# Patient Record
Sex: FEMALE | Race: WHITE | URBAN - METROPOLITAN AREA
[De-identification: names, ages, dates, MRNs, and addresses within clinical notes are randomized per-mention and may not be internally consistent; named-entity substitution may affect disease eponyms.]

---

## 2021-03-29 NOTE — PROGRESS NOTES
Chief Complaint   Patient presents with   [facility]    Wrist Pain     right no injury. started 6mnths ago. not got any better. feels a clicking          HPI:  Monse Avitia is a 51 y.o. (: 1975) here today to est care and right wrist pain. Started about 2 months ago and hurts at work with twisting motions. Has not tried anything OTC or brace. Better with break from work when she went camping. When she waked up t seems to hurt her as well. No numbness or tingling or muscle wasting or weakness. HTN  Rx: Maxide 37.5-25 mg daily  Compliant: yes  Does not check BP at home. No results found for: CREATININE     HLD  Rx: Diet-controlled, no meds  The ASCVD Risk score (Lisa Talavera., et al., 2013) failed to calculate for the following reasons:    Cannot find a previous HDL lab    Cannot find a previous total cholesterol lab     Overweight and has been working with previous provider for a years following her thyroid and hormone levels given her extremely strict diet and exercise regimen without any weight loss. No consideration given to bariatric surgery or surgical intervention. Review of Systems   Constitutional: Negative for activity change, appetite change, chills, fatigue, fever and unexpected weight change. HENT: Negative for congestion, postnasal drip, rhinorrhea, sinus pressure, sinus pain, sneezing and sore throat. Eyes: Negative for visual disturbance. Respiratory: Negative for cough, chest tightness, shortness of breath and wheezing. Cardiovascular: Negative for chest pain and palpitations. Gastrointestinal: Negative for abdominal pain, blood in stool, constipation, diarrhea, nausea and vomiting. Endocrine: Negative for cold intolerance, heat intolerance, polydipsia and polyuria. Genitourinary: Negative for dysuria, frequency, vaginal bleeding and vaginal discharge. Musculoskeletal: Positive for arthralgias.  Negative for back pain, joint swelling, myalgias and neck pain. Skin: Negative for rash and wound. Allergic/Immunologic: Negative for environmental allergies. Neurological: Negative for dizziness, tremors, syncope, weakness, light-headedness, numbness and headaches. Hematological: Negative for adenopathy. Psychiatric/Behavioral: Negative for behavioral problems, decreased concentration, sleep disturbance and suicidal ideas. The patient is not nervous/anxious. No past medical history on file. Family History   Problem Relation Age of Onset    Hypothyroidism Mother     Asthma Mother     Diabetes Mother     COPD Mother     No Known Problems Father        Social History     Tobacco Use    Smoking status: Former Smoker     Years: 2.00     Quit date:      Years since quittin.2    Smokeless tobacco: Never Used   Substance Use Topics    Alcohol use: Not Currently    Drug use: Never       New Prescriptions    MISC. DEVICES (WRIST BRACE) MISC    Wear daily and nightly on right wrist       Meds Prior to visit:  Current Outpatient Medications on File Prior to Visit   Medication Sig Dispense Refill    triamterene-hydroCHLOROthiazide (MAXZIDE-25) 37.5-25 MG per tablet Take 1 tablet by mouth daily      BLISOVI FE  1-20 MG-MCG per tablet       albuterol sulfate HFA (VENTOLIN HFA) 108 (90 Base) MCG/ACT inhaler INHALE 2 PUFFS BY MOUTH EVERY 6 HOURS AS NEEDED FOR WHEEZING OR SHORTNESS OF BREATH      fluticasone-salmeterol (ADVAIR HFA) 230-21 MCG/ACT inhaler Inhale 2 puffs into the lungs every 12 hours      Multiple Vitamin (MULTIVITAMIN) tablet Take 1 tablet by mouth daily       No current facility-administered medications on file prior to visit.       No Known Allergies    OBJECTIVE:  /84   Pulse 74   Resp 16   Ht 5' 2\" (1.575 m)   Wt 229 lb 6.4 oz (104.1 kg)   LMP 2021   SpO2 99%   Breastfeeding No   BMI 41.96 kg/m²   BP Readings from Last 2 Encounters:   21 126/84     Wt Readings from Last 3 Encounters:   21 229 lb 6.4 oz (104.1 kg)       Physical Exam  Vitals signs reviewed. Constitutional:       General: She is not in acute distress. Appearance: She is well-developed. HENT:      Head: Normocephalic and atraumatic. Right Ear: Tympanic membrane, ear canal and external ear normal. There is no impacted cerumen. Left Ear: Tympanic membrane, ear canal and external ear normal. There is no impacted cerumen. Mouth/Throat:      Mouth: Mucous membranes are moist.      Pharynx: Oropharynx is clear. No oropharyngeal exudate or posterior oropharyngeal erythema. Eyes:      General: No scleral icterus. Right eye: No discharge. Left eye: No discharge. Conjunctiva/sclera: Conjunctivae normal.      Pupils: Pupils are equal, round, and reactive to light. Neck:      Musculoskeletal: Normal range of motion and neck supple. Cardiovascular:      Rate and Rhythm: Normal rate and regular rhythm. Heart sounds: Normal heart sounds. No murmur. Comments: Radial and pedal pulses intact  Pulmonary:      Effort: Pulmonary effort is normal. No respiratory distress. Breath sounds: Normal breath sounds. No wheezing or rales. Chest:      Chest wall: No tenderness. Abdominal:      General: Bowel sounds are normal.      Palpations: Abdomen is soft. Tenderness: There is no abdominal tenderness. There is no guarding. Comments: Normal liver and spleen. No organomegaly   Musculoskeletal: Normal range of motion. General: No tenderness. Comments: Intact in all extremities. No crepitus with passive or active range of motion of right wrist or left wrist.  No point tenderness on right wrist.  When fully flexed at rest, tension experienced over posterior wrist.   Lymphadenopathy:      Cervical: No cervical adenopathy. Skin:     General: Skin is warm. Findings: No erythema or rash. Neurological:      General: No focal deficit present.       Mental Status: She is alert and oriented to person, place, and time. Mental status is at baseline. Motor: No weakness or abnormal muscle tone. Coordination: Coordination normal.      Gait: Gait normal.      Deep Tendon Reflexes: Reflexes normal.   Psychiatric:         Mood and Affect: Mood normal.         Behavior: Behavior normal.         Thought Content: Thought content normal.         Judgment: Judgment normal.         ASSESSMENT/PLAN:  1. Encounter to establish care  VS reviewed and WNL    BMI reviewed   All questions answered. F/u discussed. Healthy lifestyle modifications discussed. 2. Right wrist pain  No concern for carpal tunnel. May be tendonitis or arthritis. Recommend NSAIDs and splint during work and while sleeping. Follow up in 4-6 weeks if no improvement. May try oral steroids or referral to PT.   - Misc. Devices (WRIST BRACE) MISC; Wear daily and nightly on right wrist  Dispense: 1 each; Refill: 0    3. Elevated LDL cholesterol level  Update and treat accordingly.   - Lipid Panel; Future    4. BMI 40.0-44.9, adult ([facility] [address]  As above. Will continue dietary mods and activity.   - TSH with Reflex; Future    5. Essential hypertension  Continue regimen and update labs. Refill meds. Follow up in 3-6 months for monitoring.   - Lipid Panel; Future  - triamterene-hydroCHLOROthiazide (MAXZIDE-25) 37.5-25 MG per tablet; Take 1 tablet by mouth daily  - Comprehensive Metabolic Panel; Future        Discussed use, benefit, and side effects of prescribed medications. Barriers to medication compliance addressed. All patient questions answered. Pt voiced understanding. RTC Return in about 1 year (around 3/30/2022), or if symptoms worsen or fail to improve. No future appointments.     Fadia Meredith MD  3/30/2021  4:38 PM

## 2021-03-30 ENCOUNTER — OFFICE VISIT (OUTPATIENT)
Dept: PRIMARY CARE CLINIC | Age: 46
End: 2021-03-30
Payer: COMMERCIAL

## 2021-03-30 VITALS
RESPIRATION RATE: 16 BRPM | BODY MASS INDEX: 42.21 KG/M2 | OXYGEN SATURATION: 99 % | HEART RATE: 74 BPM | DIASTOLIC BLOOD PRESSURE: 84 MMHG | HEIGHT: 62 IN | SYSTOLIC BLOOD PRESSURE: 126 MMHG | WEIGHT: 229.4 LBS

## 2021-03-30 DIAGNOSIS — I10 ESSENTIAL HYPERTENSION: ICD-10-CM

## 2021-03-30 DIAGNOSIS — Z76.89 ENCOUNTER TO ESTABLISH CARE: Primary | ICD-10-CM

## 2021-03-30 DIAGNOSIS — M25.531 RIGHT WRIST PAIN: ICD-10-CM

## 2021-03-30 DIAGNOSIS — E78.00 ELEVATED LDL CHOLESTEROL LEVEL: ICD-10-CM

## 2021-03-30 PROCEDURE — 99204 OFFICE O/P NEW MOD 45 MIN: CPT | Performed by: FAMILY MEDICINE

## 2021-03-30 RX ORDER — MULTIVITAMIN
1 TABLET ORAL DAILY
COMMUNITY

## 2021-03-30 RX ORDER — TRIAMTERENE AND HYDROCHLOROTHIAZIDE 37.5; 25 MG/1; MG/1
1 TABLET ORAL DAILY
COMMUNITY
Start: 2020-06-25

## 2021-03-30 RX ORDER — ALBUTEROL SULFATE 90 UG/1
AEROSOL, METERED RESPIRATORY (INHALATION)
COMMUNITY
Start: 2020-11-06 | End: 2021-11-12 | Stop reason: SDUPTHER

## 2021-03-30 RX ORDER — NORETHINDRONE ACETATE AND ETHINYL ESTRADIOL 1MG-20(21)
KIT ORAL
COMMUNITY
Start: 2021-02-17

## 2021-03-30 SDOH — HEALTH STABILITY: MENTAL HEALTH: HOW OFTEN DO YOU HAVE A DRINK CONTAINING ALCOHOL?: NOT ASKED

## 2021-03-30 SDOH — ECONOMIC STABILITY: FOOD INSECURITY: WITHIN THE PAST 12 MONTHS, YOU WORRIED THAT YOUR FOOD WOULD RUN OUT BEFORE YOU GOT MONEY TO BUY MORE.: NEVER TRUE

## 2021-03-30 SDOH — ECONOMIC STABILITY: TRANSPORTATION INSECURITY
IN THE PAST 12 MONTHS, HAS LACK OF TRANSPORTATION KEPT YOU FROM MEETINGS, WORK, OR FROM GETTING THINGS NEEDED FOR DAILY LIVING?: NO

## 2021-03-30 SDOH — ECONOMIC STABILITY: TRANSPORTATION INSECURITY
IN THE PAST 12 MONTHS, HAS THE LACK OF TRANSPORTATION KEPT YOU FROM MEDICAL APPOINTMENTS OR FROM GETTING MEDICATIONS?: NO

## 2021-03-30 SDOH — HEALTH STABILITY: MENTAL HEALTH: HOW MANY STANDARD DRINKS CONTAINING ALCOHOL DO YOU HAVE ON A TYPICAL DAY?: NOT ASKED

## 2021-03-30 SDOH — ECONOMIC STABILITY: INCOME INSECURITY: HOW HARD IS IT FOR YOU TO PAY FOR THE VERY BASICS LIKE FOOD, HOUSING, MEDICAL CARE, AND HEATING?: NOT HARD AT ALL

## 2021-03-30 ASSESSMENT — ENCOUNTER SYMPTOMS
SORE THROAT: 0
CHEST TIGHTNESS: 0
SINUS PRESSURE: 0
BLOOD IN STOOL: 0
BACK PAIN: 0
SINUS PAIN: 0
ABDOMINAL PAIN: 0
DIARRHEA: 0
CONSTIPATION: 0
RHINORRHEA: 0
NAUSEA: 0
VOMITING: 0
COUGH: 0
SHORTNESS OF BREATH: 0
WHEEZING: 0

## 2021-03-30 ASSESSMENT — PATIENT HEALTH QUESTIONNAIRE - PHQ9
SUM OF ALL RESPONSES TO PHQ9 QUESTIONS 1 & 2: 0
SUM OF ALL RESPONSES TO PHQ QUESTIONS 1-9: 0
SUM OF ALL RESPONSES TO PHQ QUESTIONS 1-9: 0
DEPRESSION UNABLE TO ASSESS: FUNCTIONAL CAPACITY MOTIVATION LIMITS ACCURACY
2. FEELING DOWN, DEPRESSED OR HOPELESS: 0

## 2021-03-31 DIAGNOSIS — I10 ESSENTIAL HYPERTENSION: ICD-10-CM

## 2021-03-31 DIAGNOSIS — E78.00 ELEVATED LDL CHOLESTEROL LEVEL: ICD-10-CM

## 2021-03-31 LAB
A/G RATIO: 1.4 (ref 1.1–2.2)
ALBUMIN SERPL-MCNC: 4.1 G/DL (ref 3.4–5)
ALP BLD-CCNC: 84 U/L (ref 40–129)
ALT SERPL-CCNC: 15 U/L (ref 10–40)
ANION GAP SERPL CALCULATED.3IONS-SCNC: 12 MMOL/L (ref 3–16)
AST SERPL-CCNC: 18 U/L (ref 15–37)
BILIRUB SERPL-MCNC: 0.3 MG/DL (ref 0–1)
BUN BLDV-MCNC: 10 MG/DL (ref 7–20)
CALCIUM SERPL-MCNC: 8.8 MG/DL (ref 8.3–10.6)
CHLORIDE BLD-SCNC: 104 MMOL/L (ref 99–110)
CHOLESTEROL, TOTAL: 240 MG/DL (ref 0–199)
CO2: 25 MMOL/L (ref 21–32)
CREAT SERPL-MCNC: 0.6 MG/DL (ref 0.6–1.1)
GFR AFRICAN AMERICAN: >60
GFR NON-AFRICAN AMERICAN: >60
GLOBULIN: 3 G/DL
GLUCOSE BLD-MCNC: 87 MG/DL (ref 70–99)
HDLC SERPL-MCNC: 52 MG/DL (ref 40–60)
LDL CHOLESTEROL CALCULATED: 157 MG/DL
POTASSIUM SERPL-SCNC: 4.2 MMOL/L (ref 3.5–5.1)
SODIUM BLD-SCNC: 141 MMOL/L (ref 136–145)
T4 FREE: 1.2 NG/DL (ref 0.9–1.8)
TOTAL PROTEIN: 7.1 G/DL (ref 6.4–8.2)
TRIGL SERPL-MCNC: 154 MG/DL (ref 0–150)
TSH REFLEX: 4.42 UIU/ML (ref 0.27–4.2)
VLDLC SERPL CALC-MCNC: 31 MG/DL

## 2021-10-19 ENCOUNTER — TELEPHONE (OUTPATIENT)
Dept: PRIMARY CARE CLINIC | Age: 46
End: 2021-10-19

## 2021-10-19 DIAGNOSIS — R79.89 ELEVATED TSH: ICD-10-CM

## 2021-10-19 DIAGNOSIS — I10 ESSENTIAL HYPERTENSION: Primary | ICD-10-CM

## 2021-10-19 DIAGNOSIS — E78.2 MIXED HYPERLIPIDEMIA: ICD-10-CM

## 2021-10-20 DIAGNOSIS — E78.2 MIXED HYPERLIPIDEMIA: ICD-10-CM

## 2021-10-20 DIAGNOSIS — R79.89 ELEVATED TSH: ICD-10-CM

## 2021-10-20 DIAGNOSIS — I10 ESSENTIAL HYPERTENSION: ICD-10-CM

## 2021-10-20 LAB
A/G RATIO: 1.6 (ref 1.1–2.2)
ALBUMIN SERPL-MCNC: 4.2 G/DL (ref 3.4–5)
ALP BLD-CCNC: 90 U/L (ref 40–129)
ALT SERPL-CCNC: 21 U/L (ref 10–40)
ANION GAP SERPL CALCULATED.3IONS-SCNC: 14 MMOL/L (ref 3–16)
AST SERPL-CCNC: 21 U/L (ref 15–37)
BASOPHILS ABSOLUTE: 0.1 K/UL (ref 0–0.2)
BASOPHILS RELATIVE PERCENT: 0.7 %
BILIRUB SERPL-MCNC: 0.4 MG/DL (ref 0–1)
BUN BLDV-MCNC: 15 MG/DL (ref 7–20)
CALCIUM SERPL-MCNC: 8.9 MG/DL (ref 8.3–10.6)
CHLORIDE BLD-SCNC: 102 MMOL/L (ref 99–110)
CHOLESTEROL, TOTAL: 241 MG/DL (ref 0–199)
CO2: 26 MMOL/L (ref 21–32)
CREAT SERPL-MCNC: 0.6 MG/DL (ref 0.6–1.1)
EOSINOPHILS ABSOLUTE: 0.2 K/UL (ref 0–0.6)
EOSINOPHILS RELATIVE PERCENT: 1.7 %
GFR AFRICAN AMERICAN: >60
GFR NON-AFRICAN AMERICAN: >60
GLOBULIN: 2.7 G/DL
GLUCOSE BLD-MCNC: 86 MG/DL (ref 70–99)
HCT VFR BLD CALC: 37.8 % (ref 36–48)
HDLC SERPL-MCNC: 50 MG/DL (ref 40–60)
HEMOGLOBIN: 12.5 G/DL (ref 12–16)
LDL CHOLESTEROL CALCULATED: 161 MG/DL
LYMPHOCYTES ABSOLUTE: 4.3 K/UL (ref 1–5.1)
LYMPHOCYTES RELATIVE PERCENT: 37.7 %
MCH RBC QN AUTO: 27.8 PG (ref 26–34)
MCHC RBC AUTO-ENTMCNC: 33 G/DL (ref 31–36)
MCV RBC AUTO: 84.1 FL (ref 80–100)
MONOCYTES ABSOLUTE: 0.8 K/UL (ref 0–1.3)
MONOCYTES RELATIVE PERCENT: 7 %
NEUTROPHILS ABSOLUTE: 6 K/UL (ref 1.7–7.7)
NEUTROPHILS RELATIVE PERCENT: 52.9 %
PDW BLD-RTO: 14 % (ref 12.4–15.4)
PLATELET # BLD: 267 K/UL (ref 135–450)
PMV BLD AUTO: 9.5 FL (ref 5–10.5)
POTASSIUM SERPL-SCNC: 3.7 MMOL/L (ref 3.5–5.1)
RBC # BLD: 4.49 M/UL (ref 4–5.2)
SODIUM BLD-SCNC: 142 MMOL/L (ref 136–145)
TOTAL PROTEIN: 6.9 G/DL (ref 6.4–8.2)
TRIGL SERPL-MCNC: 152 MG/DL (ref 0–150)
TSH REFLEX: 2.53 UIU/ML (ref 0.27–4.2)
VLDLC SERPL CALC-MCNC: 30 MG/DL
WBC # BLD: 11.4 K/UL (ref 4–11)

## 2021-10-25 NOTE — PROGRESS NOTES
Chief Complaint   Patient presents with    Follow-up         ASSESSMENT/PLAN:  1. Essential hypertension  Controlled in triage  We will continue Maxide 37.5 - 25 mg daily  Labs up-to-date, reviewed with patient  Continue regimen and follow-up in 3 to 6 months    2. Mixed hyperlipidemia  Labs up-to-date, reviewed with patient  All questions answered  Stratified ASCVD risk in the next 10 years  Discussed not taking aspirin but starting statin  Patient would like to start in follow-up to repeat lipid panel in 6 months  - atorvastatin (LIPITOR) 20 MG tablet; Take 1 tablet by mouth daily  Dispense: 90 tablet; Refill: 3    3. Colon cancer screening  Ordered and will fax  - [employer] (For External Results Only); Future    4. Morbid Obesity  Discussed typical diet  Unsure of caloric intake on a daily basis. She eats the same foods every day  Walks 3 miles. Follow up in 3 months  Has lost 4 lbs since LOV. HPI:  Reina Ga is a 51 y.o. (: 1975) here today for     HTN  Rx: Maxide 37.5-25 mg daily  Compliant: yes  Does not check BP at home. Lab Results   Component Value Date    CREATININE 0.6 10/20/2021       HLD  The 10-year ASCVD risk score (Zuleyma Correa, et al., 2013) is: 2.2%    Values used to calculate the score:      Age: 51 years      Sex: Female      Is Non- : No      Diabetic: No      Tobacco smoker: No      Systolic Blood Pressure: 598 mmHg      Is BP treated: Yes      HDL Cholesterol: 50 mg/dL      Total Cholesterol: 241 mg/dL      Wt Readings from Last 3 Encounters:   10/26/21 225 lb (102.1 kg)   21 229 lb 6.4 oz (104.1 kg)   Not interested in bariatric surgery  Dietary intake:  Eats the same thing everyday  [place] yogurt and grapes  Wrap with turkey or chicken  Salad with chicken  Cubed cheese, 5 cubes  Walks 3 miles a day      Review of Systems   Constitutional: Negative for appetite change, chills, fatigue and fever. HENT: Negative for congestion.     Eyes: Negative for pain and visual disturbance. Respiratory: Negative for cough and shortness of breath. Cardiovascular: Negative for chest pain and palpitations. Gastrointestinal: Negative for abdominal pain, constipation and diarrhea. Genitourinary: Negative for difficulty urinating. Musculoskeletal: Negative for arthralgias. Skin: Negative for rash and wound. Neurological: Negative for dizziness, weakness, light-headedness and headaches. Hematological: Does not bruise/bleed easily. Psychiatric/Behavioral: Negative for behavioral problems. History reviewed. No pertinent past medical history. Family History   Problem Relation Age of Onset   Courtenay Spurling Hypothyroidism Mother     Asthma Mother     Diabetes Mother     COPD Mother     No Known Problems Father        Social History     Tobacco Use    Smoking status: Former Smoker     Years: 2.00     Quit date:      Years since quittin.8    Smokeless tobacco: Never Used   Substance Use Topics    Alcohol use: Not Currently    Drug use: Never       New Prescriptions    ATORVASTATIN (LIPITOR) 20 MG TABLET    Take 1 tablet by mouth daily       Meds Prior to visit:  Current Outpatient Medications on File Prior to Visit   Medication Sig Dispense Refill    triamterene-hydroCHLOROthiazide (MAXZIDE-25) 37.5-25 MG per tablet Take 1 tablet by mouth daily      BLISOVI FE  1-20 MG-MCG per tablet       albuterol sulfate HFA (VENTOLIN HFA) 108 (90 Base) MCG/ACT inhaler INHALE 2 PUFFS BY MOUTH EVERY 6 HOURS AS NEEDED FOR WHEEZING OR SHORTNESS OF BREATH      fluticasone-salmeterol (ADVAIR HFA) 230-21 MCG/ACT inhaler Inhale 2 puffs into the lungs every 12 hours      Multiple Vitamin (MULTIVITAMIN) tablet Take 1 tablet by mouth daily      Misc. Devices (WRIST BRACE) MISC Wear daily and nightly on right wrist 1 each 0    montelukast (SINGULAIR) 10 MG tablet        No current facility-administered medications on file prior to visit.      No Known Allergies    OBJECTIVE:  /82 (Site: Left Upper Arm)   Pulse 92   Wt 225 lb (102.1 kg)   SpO2 99%   BMI 41.15 kg/m²   BP Readings from Last 2 Encounters:   10/26/21 135/82   03/30/21 126/84     Wt Readings from Last 3 Encounters:   10/26/21 225 lb (102.1 kg)   03/30/21 229 lb 6.4 oz (104.1 kg)       Physical Exam  Vitals reviewed. Constitutional:       General: She is not in acute distress. Appearance: Normal appearance. She is well-developed. She is not ill-appearing. HENT:      Head: Normocephalic and atraumatic. Right Ear: Tympanic membrane, ear canal and external ear normal. There is no impacted cerumen. Left Ear: Tympanic membrane, ear canal and external ear normal. There is no impacted cerumen. Nose: Nose normal. No congestion. Mouth/Throat:      Mouth: Mucous membranes are moist.      Pharynx: Oropharynx is clear. No oropharyngeal exudate or posterior oropharyngeal erythema. Eyes:      General: No scleral icterus. Right eye: No discharge. Left eye: No discharge. Extraocular Movements: Extraocular movements intact. Conjunctiva/sclera: Conjunctivae normal.      Pupils: Pupils are equal, round, and reactive to light. Cardiovascular:      Rate and Rhythm: Normal rate and regular rhythm. Pulses: Normal pulses. Heart sounds: Normal heart sounds. No murmur heard. Comments: Radial and pedal pulses intact  Pulmonary:      Effort: Pulmonary effort is normal. No respiratory distress. Breath sounds: Normal breath sounds. No stridor. No wheezing, rhonchi or rales. Chest:      Chest wall: No tenderness. Abdominal:      General: Bowel sounds are normal.      Palpations: Abdomen is soft. Tenderness: There is no abdominal tenderness. There is no guarding. Comments: Normal liver and spleen. No organomegaly   Musculoskeletal:         General: No tenderness. Normal range of motion.       Cervical back: Normal range of motion and neck supple. Right lower leg: No edema. Left lower leg: No edema. Comments: Intact in all extremities   Lymphadenopathy:      Cervical: No cervical adenopathy. Skin:     General: Skin is warm. Capillary Refill: Capillary refill takes less than 2 seconds. Findings: No erythema or rash. Neurological:      General: No focal deficit present. Mental Status: She is alert and oriented to person, place, and time. Mental status is at baseline. Motor: No weakness or abnormal muscle tone. Coordination: Coordination normal.      Gait: Gait normal.      Deep Tendon Reflexes: Reflexes normal.   Psychiatric:         Mood and Affect: Mood normal.         Behavior: Behavior normal.         Thought Content:  Thought content normal.         Judgment: Judgment normal.         Lab Results   Component Value Date    WBC 11.4 (H) 10/20/2021    HGB 12.5 10/20/2021    HCT 37.8 10/20/2021    MCV 84.1 10/20/2021     10/20/2021     Lab Results   Component Value Date     10/20/2021    K 3.7 10/20/2021     10/20/2021    CO2 26 10/20/2021    BUN 15 10/20/2021    CREATININE 0.6 10/20/2021    GLUCOSE 86 10/20/2021    CALCIUM 8.9 10/20/2021    PROT 6.9 10/20/2021    LABALBU 4.2 10/20/2021    BILITOT 0.4 10/20/2021    ALKPHOS 90 10/20/2021    AST 21 10/20/2021    ALT 21 10/20/2021    LABGLOM >60 10/20/2021    GFRAA >60 10/20/2021    AGRATIO 1.6 10/20/2021    GLOB 2.7 10/20/2021     Lab Results   Component Value Date    CHOL 241 (H) 10/20/2021    CHOL 240 (H) 03/31/2021     Lab Results   Component Value Date    TRIG 152 (H) 10/20/2021    TRIG 154 (H) 03/31/2021     Lab Results   Component Value Date    HDL 50 10/20/2021    HDL 52 03/31/2021     Lab Results   Component Value Date    LDLCALC 161 (H) 10/20/2021    LDLCALC 157 (H) 03/31/2021     Lab Results   Component Value Date    LABVLDL 30 10/20/2021    LABVLDL 31 03/31/2021     No results found for: LABA1C      Discussed use, benefit, and side effects of prescribed medications. Barriers to medication compliance addressed. All patient questions answered. Pt voiced understanding. RTC Return in about 3 months (around 1/26/2022).     Future Appointments   Date Time Provider Tameka Dumont   1/25/2022  1:00 PM MD Shell Angulo MD  10/26/2021  4:16 PM

## 2021-10-26 ENCOUNTER — OFFICE VISIT (OUTPATIENT)
Dept: PRIMARY CARE CLINIC | Age: 46
End: 2021-10-26
Payer: COMMERCIAL

## 2021-10-26 VITALS
SYSTOLIC BLOOD PRESSURE: 135 MMHG | HEART RATE: 92 BPM | WEIGHT: 225 LBS | OXYGEN SATURATION: 99 % | BODY MASS INDEX: 41.15 KG/M2 | DIASTOLIC BLOOD PRESSURE: 82 MMHG

## 2021-10-26 DIAGNOSIS — E78.2 MIXED HYPERLIPIDEMIA: ICD-10-CM

## 2021-10-26 DIAGNOSIS — Z12.11 COLON CANCER SCREENING: ICD-10-CM

## 2021-10-26 DIAGNOSIS — I10 ESSENTIAL HYPERTENSION: Primary | ICD-10-CM

## 2021-10-26 PROBLEM — R79.89 ELEVATED TSH: Status: ACTIVE | Noted: 2019-02-22

## 2021-10-26 PROBLEM — K21.9 GERD (GASTROESOPHAGEAL REFLUX DISEASE): Status: ACTIVE | Noted: 2019-09-24

## 2021-10-26 PROCEDURE — 99214 OFFICE O/P EST MOD 30 MIN: CPT | Performed by: FAMILY MEDICINE

## 2021-10-26 RX ORDER — ATORVASTATIN CALCIUM 20 MG/1
20 TABLET, FILM COATED ORAL DAILY
Qty: 90 TABLET | Refills: 3 | Status: SHIPPED | OUTPATIENT
Start: 2021-10-26 | End: 2022-05-11 | Stop reason: SDUPTHER

## 2021-10-26 RX ORDER — MONTELUKAST SODIUM 10 MG/1
TABLET ORAL
COMMUNITY
Start: 2021-08-03

## 2021-10-26 ASSESSMENT — ENCOUNTER SYMPTOMS
ABDOMINAL PAIN: 0
SHORTNESS OF BREATH: 0
CONSTIPATION: 0
DIARRHEA: 0
EYE PAIN: 0
COUGH: 0

## 2021-11-12 RX ORDER — ALBUTEROL SULFATE 90 UG/1
AEROSOL, METERED RESPIRATORY (INHALATION)
Qty: 18 G | Refills: 3 | Status: SHIPPED | OUTPATIENT
Start: 2021-11-12 | End: 2022-04-22 | Stop reason: SDUPTHER

## 2021-12-27 ENCOUNTER — OFFICE VISIT (OUTPATIENT)
Dept: PRIMARY CARE CLINIC | Age: 46
End: 2021-12-27
Payer: COMMERCIAL

## 2021-12-27 VITALS
DIASTOLIC BLOOD PRESSURE: 72 MMHG | WEIGHT: 224.6 LBS | HEIGHT: 62 IN | SYSTOLIC BLOOD PRESSURE: 136 MMHG | BODY MASS INDEX: 41.33 KG/M2 | TEMPERATURE: 97.8 F

## 2021-12-27 DIAGNOSIS — J01.90 ACUTE BACTERIAL RHINOSINUSITIS: Primary | ICD-10-CM

## 2021-12-27 DIAGNOSIS — J45.41 MODERATE PERSISTENT ASTHMA WITH EXACERBATION: ICD-10-CM

## 2021-12-27 DIAGNOSIS — R05.9 COUGH: ICD-10-CM

## 2021-12-27 DIAGNOSIS — B96.89 ACUTE BACTERIAL RHINOSINUSITIS: Primary | ICD-10-CM

## 2021-12-27 PROCEDURE — 99214 OFFICE O/P EST MOD 30 MIN: CPT | Performed by: STUDENT IN AN ORGANIZED HEALTH CARE EDUCATION/TRAINING PROGRAM

## 2021-12-27 RX ORDER — AZITHROMYCIN 250 MG/1
250 TABLET, FILM COATED ORAL SEE ADMIN INSTRUCTIONS
Qty: 6 TABLET | Refills: 0 | Status: SHIPPED | OUTPATIENT
Start: 2021-12-27 | End: 2022-01-01

## 2021-12-27 RX ORDER — BENZONATATE 100 MG/1
100 CAPSULE ORAL 2 TIMES DAILY PRN
Qty: 20 CAPSULE | Refills: 0 | Status: SHIPPED | OUTPATIENT
Start: 2021-12-27 | End: 2022-01-03

## 2021-12-27 RX ORDER — AMOXICILLIN AND CLAVULANATE POTASSIUM 875; 125 MG/1; MG/1
1 TABLET, FILM COATED ORAL 2 TIMES DAILY
Qty: 20 TABLET | Refills: 0 | Status: SHIPPED | OUTPATIENT
Start: 2021-12-27 | End: 2022-01-06

## 2021-12-27 RX ORDER — PREDNISONE 20 MG/1
40 TABLET ORAL DAILY
Qty: 10 TABLET | Refills: 0 | Status: SHIPPED | OUTPATIENT
Start: 2021-12-27 | End: 2022-01-01

## 2021-12-27 NOTE — LETTER
[facility]  [address] [place] [id number]  Phone: [phone]  Fax: [phone],         December 27, 2021     Patient: Arnaldo Cancer   YOB: 1975   Date of Visit: 12/27/2021       To Whom It May Concern: It is my medical opinion that Grisel Imus should remain out of work for the next 2 days. If you have any questions or concerns, please don't hesitate to call.     Sincerely,        Salo Medellin DO

## 2021-12-27 NOTE — PROGRESS NOTES
[facility]  2021    Camila Paz (:  1975) is a 51 y.o. female, here for evaluation of the following medical concerns:    Chief Complaint   Patient presents with    Cough     3 weeks    Oral Swelling     Both Ears are in pain, eyes are pink in the moring and closed shut for 3 days         ASSESSMENT/ PLAN  1. Acute bacterial rhinosinusitis  Uncontrolled. This is likely a upper respiratory viral infection that has progressed to a secondary bacterial infection. Start antibiotic, follow-up if progressive or worsening  - amoxicillin-clavulanate (AUGMENTIN) 875-125 MG per tablet; Take 1 tablet by mouth 2 times daily for 10 days  Dispense: 20 tablet; Refill: 0    2. Moderate persistent asthma with exacerbation  Uncontrolled, will start prednisone and add Zithromax to cover possible atypical pneumonia. Chest x-ray per below. May ultimately need a daily maintenance inhaler.  - azithromycin (ZITHROMAX) 250 MG tablet; Take 1 tablet by mouth See Admin Instructions for 5 days 500mg on day 1 followed by 250mg on days 2 - 5  Dispense: 6 tablet; Refill: 0  - predniSONE (DELTASONE) 20 MG tablet; Take 2 tablets by mouth daily for 5 days  Dispense: 10 tablet; Refill: 0    3. Cough  Uncontrolled, assess for bacterial pneumonia. 14 Covid tests and an influenza test have been negative. Already started on Augmentin and Zithromax regimen. Tessalon Perles for symptomatic care. - XR CHEST (2 VW); Future   - benzonatate (TESSALON) 100 MG capsule; Take 1 capsule by mouth 2 times daily as needed for Cough  Dispense: 20 capsule; Refill: 0    Return in about 3 days if symptoms worsen or fail to improve. HPI  Patient with a history of asthma presents today with 3 weeks of productive cough, wheezing, sinus and bilateral ear pressure, myalgias. She woke up this morning with bilateral conjunctivitis and eye redness. She has been Covid tested daily for the past 2 weeks, and all tests have been negative. She has received her Covid vaccine and booster. She has been using her rescue inhaler without improvement in symptoms. ROS  Review of Systems   Constitutional: Positive for fatigue. Negative for activity change, appetite change and fever. HENT: Positive for congestion, ear pain, rhinorrhea and sinus pressure. Negative for sore throat. Eyes: Positive for redness. Negative for pain. Respiratory: Positive for cough and wheezing. Negative for shortness of breath. Cardiovascular: Negative for chest pain. Gastrointestinal: Negative for diarrhea, nausea and vomiting. Genitourinary: Negative for decreased urine volume. Musculoskeletal: Positive for myalgias. Neurological: Negative for headaches. HISTORIES  Current Outpatient Medications on File Prior to Visit   Medication Sig Dispense Refill    albuterol sulfate HFA (VENTOLIN HFA) 108 (90 Base) MCG/ACT inhaler INHALE 2 PUFFS BY MOUTH EVERY 6 HOURS AS NEEDED FOR WHEEZING OR SHORTNESS OF BREATH 18 g 3    atorvastatin (LIPITOR) 20 MG tablet Take 1 tablet by mouth daily 90 tablet 3    montelukast (SINGULAIR) 10 MG tablet       triamterene-hydroCHLOROthiazide (MAXZIDE-25) 37.5-25 MG per tablet Take 1 tablet by mouth daily      BLISOVI FE 1/20 1-20 MG-MCG per tablet       fluticasone-salmeterol (ADVAIR HFA) 230-21 MCG/ACT inhaler Inhale 2 puffs into the lungs every 12 hours      Multiple Vitamin (MULTIVITAMIN) tablet Take 1 tablet by mouth daily      Misc. Devices (WRIST BRACE) MISC Wear daily and nightly on right wrist 1 each 0     No current facility-administered medications on file prior to visit. No Known Allergies  No past medical history on file.   Patient Active Problem List   Diagnosis    Varicose veins of leg with swelling    Mixed hyperlipidemia    Mild persistent asthma without complication    Hypertension    GERD (gastroesophageal reflux disease)    Family history of diabetes mellitus    Elevated TSH    Baker's cyst of knee     No past surgical history on file. Social History     Socioeconomic History    Marital status:      Spouse name: Not on file    Number of children: Not on file    Years of education: Not on file    Highest education level: Not on file   Occupational History    Not on file   Tobacco Use    Smoking status: Former Smoker     Years: 2.00     Quit date:      Years since quittin.0    Smokeless tobacco: Never Used   Substance and Sexual Activity    Alcohol use: Not Currently    Drug use: Never    Sexual activity: Not Currently     Birth control/protection: Pill   Other Topics Concern    Not on file   Social History Narrative    Not on file     Social Determinants of Health     Financial Resource Strain: Low Risk     Difficulty of Paying Living Expenses: Not hard at all   Food Insecurity: No Food Insecurity    Worried About [address] in the Last Year: Never true    [address] [employer] in the Last Year: Never true   Transportation Needs: No Transportation Needs    Lack of Transportation (Medical): No    Lack of Transportation (Non-Medical):  No   Physical Activity:     Days of Exercise per Week: Not on file    Minutes of Exercise per Session: Not on file   Stress:     Feeling of Stress : Not on file   Social Connections:     Frequency of Communication with Friends and Family: Not on file    Frequency of Social Gatherings with Friends and Family: Not on file    Attends Orthodoxy Services: Not on file    Active Member of Clubs or Organizations: Not on file    Attends Club or Organization Meetings: Not on file    Marital Status: Not on file   Intimate Partner Violence:     Fear of Current or Ex-Partner: Not on file    Emotionally Abused: Not on file    Physically Abused: Not on file    Sexually Abused: Not on file   Housing Stability:     Unable to Pay for Housing in the Last Year: Not on file    Number of Places Lived in the Last Year: Not on file    Unstable Housing in the Last Year: Not on file      Family History   Problem Relation Age of Onset    Hypothyroidism Mother     Asthma Mother     Diabetes Mother     COPD Mother     No Known Problems Father        PE  Vitals:    12/27/21 1407   BP: 136/72   Site: Left Upper Arm   Position: Sitting   Cuff Size: Large Adult   Temp: 97.8 °F (36.6 °C)   Weight: 224 lb 9.6 oz (101.9 kg)   Height: 5' 2\" (1.575 m)     Estimated body mass index is 41.08 kg/m² as calculated from the following:    Height as of this encounter: 5' 2\" (1.575 m). Weight as of this encounter: 224 lb 9.6 oz (101.9 kg). Physical Exam  Vitals reviewed. Constitutional:       General: She is not in acute distress. Appearance: Normal appearance. She is ill-appearing. HENT:      Head: Normocephalic and atraumatic. Right Ear: Tympanic membrane, ear canal and external ear normal. There is no impacted cerumen. Left Ear: Tympanic membrane, ear canal and external ear normal. There is no impacted cerumen. Nose: Congestion and rhinorrhea present. Mouth/Throat:      Mouth: Mucous membranes are moist.      Pharynx: Oropharynx is clear. No oropharyngeal exudate. Eyes:      Conjunctiva/sclera: Conjunctivae normal.      Pupils: Pupils are equal, round, and reactive to light. Cardiovascular:      Rate and Rhythm: Normal rate and regular rhythm. Pulses: Normal pulses. Heart sounds: Normal heart sounds. Pulmonary:      Effort: Pulmonary effort is normal. No respiratory distress. Breath sounds: Wheezing present. No rhonchi. Comments: Harsh cough. Abdominal:      General: Abdomen is flat. Bowel sounds are normal.   Musculoskeletal:      Cervical back: Normal range of motion. Right lower leg: No edema. Left lower leg: No edema. Lymphadenopathy:      Cervical: No cervical adenopathy. Skin:     General: Skin is warm. Capillary Refill: Capillary refill takes less than 2 seconds.       Coloration: Skin is not jaundiced or pale. Neurological:      Mental Status: She is alert. Psychiatric:         Mood and Affect: Mood normal.         Behavior: Behavior normal.         Katie Brown DO    This dictation was generated by voice recognition computer software. Although all attempts are made to edit the dictation for accuracy, there may be errors in the transcription that are not intended.

## 2021-12-27 NOTE — PATIENT INSTRUCTIONS
Patient Education        Sinusitis: Care Instructions  Your Care Instructions     Sinusitis is an infection of the lining of the sinus cavities in your head. Sinusitis often follows a cold. It causes pain and pressure in your head and face. In most cases, sinusitis gets better on its own in 1 to 2 weeks. But some mild symptoms may last for several weeks. Sometimes antibiotics are needed. Follow-up care is a key part of your treatment and safety. Be sure to make and go to all appointments, and call your doctor if you are having problems. It's also a good idea to know your test results and keep a list of the medicines you take. How can you care for yourself at home? · Take an over-the-counter pain medicine, such as acetaminophen (Tylenol), ibuprofen (Advil, Motrin), or naproxen (Aleve). Read and follow all instructions on the label. · If the doctor prescribed antibiotics, take them as directed. Do not stop taking them just because you feel better. You need to take the full course of antibiotics. · Be careful when taking over-the-counter cold or flu medicines and Tylenol at the same time. Many of these medicines have acetaminophen, which is Tylenol. Read the labels to make sure that you are not taking more than the recommended dose. Too much acetaminophen (Tylenol) can be harmful. · Breathe warm, moist air from a steamy shower, a hot bath, or a sink filled with hot water. Avoid cold, dry air. Using a humidifier in your home may help. Follow the directions for cleaning the machine. · Use saline (saltwater) nasal washes. This can help keep your nasal passages open and wash out mucus and bacteria. You can buy saline nose drops at a grocery store or drugstore. Or you can make your own at home by adding 1 teaspoon of salt and 1 teaspoon of baking soda to 2 cups of distilled water. If you make your own, fill a bulb syringe with the solution, insert the tip into your nostril, and squeeze gently.  Fidel mclaughlin nose.  · Put a hot, wet towel or a warm gel pack on your face 3 or 4 times a day for 5 to 10 minutes each time. · Try a decongestant nasal spray like oxymetazoline (Afrin). Do not use it for more than 3 days in a row. Using it for more than 3 days can make your congestion worse. When should you call for help? Call your doctor now or seek immediate medical care if:    · You have new or worse swelling or redness in your face or around your eyes.     · You have a new or higher fever. Watch closely for changes in your health, and be sure to contact your doctor if:    · You have new or worse facial pain.     · The mucus from your nose becomes thicker (like pus) or has new blood in it.     · You are not getting better as expected. Where can you learn more? Go to [email or web address] [email or web address] org and sign in to your [employer] account. Enter E821 in the [employer] box to learn more about \"Sinusitis: Care Instructions. \"     If you do not have an account, please click on the \"Sign Up Now\" link. Current as of: September 8, 2021               Content Version: 13.1  © 8954-8055 Healthwise, Incorporated. Care instructions adapted under license by [facility] [place] ([facility]). If you have questions about a medical condition or this instruction, always ask your healthcare professional. [place] [address] any warranty or liability for your use of this information.

## 2021-12-28 ASSESSMENT — ENCOUNTER SYMPTOMS
SHORTNESS OF BREATH: 0
VOMITING: 0
EYE REDNESS: 1
DIARRHEA: 0
COUGH: 1
NAUSEA: 0
SINUS PRESSURE: 1
SORE THROAT: 0
RHINORRHEA: 1
WHEEZING: 1
EYE PAIN: 0

## 2022-04-22 ENCOUNTER — PATIENT MESSAGE (OUTPATIENT)
Dept: PRIMARY CARE CLINIC | Age: 47
End: 2022-04-22

## 2022-04-22 RX ORDER — ALBUTEROL SULFATE 90 UG/1
AEROSOL, METERED RESPIRATORY (INHALATION)
Qty: 18 G | Refills: 3 | Status: SHIPPED | OUTPATIENT
Start: 2022-04-22 | End: 2022-05-11 | Stop reason: SDUPTHER

## 2022-04-22 NOTE — TELEPHONE ENCOUNTER
From: Padilla Cuevas  To: Dr. Hannah Mota: 4/22/2022 8:40 AM EDT  Subject: Med refill     Can i get a refill on my Albuterol inhaler called into [facility] on [address] in [place] please

## 2022-04-22 NOTE — TELEPHONE ENCOUNTER
Medication:   Requested Prescriptions     Pending Prescriptions Disp Refills    albuterol sulfate HFA (VENTOLIN HFA) 108 (90 Base) MCG/ACT inhaler 18 g 3     Sig: INHALE 2 PUFFS BY MOUTH EVERY 6 HOURS AS NEEDED FOR WHEEZING OR SHORTNESS OF BREATH        Last Filled:      Patient Phone Number: [phone] (home)     Last appt: 12/27/2021   Next appt: Visit date not found    Last OARRS: No flowsheet data found.

## 2022-05-11 ENCOUNTER — OFFICE VISIT (OUTPATIENT)
Dept: PRIMARY CARE CLINIC | Age: 47
End: 2022-05-11
Payer: COMMERCIAL

## 2022-05-11 VITALS
WEIGHT: 235 LBS | HEIGHT: 62 IN | BODY MASS INDEX: 43.24 KG/M2 | TEMPERATURE: 96.8 F | SYSTOLIC BLOOD PRESSURE: 142 MMHG | HEART RATE: 84 BPM | DIASTOLIC BLOOD PRESSURE: 92 MMHG

## 2022-05-11 DIAGNOSIS — J45.41 MODERATE PERSISTENT ASTHMA WITH EXACERBATION: Primary | ICD-10-CM

## 2022-05-11 DIAGNOSIS — E66.01 CLASS 3 SEVERE OBESITY DUE TO EXCESS CALORIES WITH SERIOUS COMORBIDITY AND BODY MASS INDEX (BMI) OF 40.0 TO 44.9 IN ADULT (HCC): ICD-10-CM

## 2022-05-11 DIAGNOSIS — I10 HYPERTENSION, UNSPECIFIED TYPE: ICD-10-CM

## 2022-05-11 DIAGNOSIS — E78.2 MIXED HYPERLIPIDEMIA: ICD-10-CM

## 2022-05-11 DIAGNOSIS — J30.2 SEASONAL ALLERGIES: ICD-10-CM

## 2022-05-11 PROCEDURE — 99214 OFFICE O/P EST MOD 30 MIN: CPT | Performed by: FAMILY MEDICINE

## 2022-05-11 RX ORDER — CETIRIZINE HYDROCHLORIDE 10 MG/1
10 TABLET ORAL DAILY
Qty: 90 TABLET | Refills: 1 | Status: SHIPPED | OUTPATIENT
Start: 2022-05-11 | End: 2022-10-20

## 2022-05-11 RX ORDER — ATORVASTATIN CALCIUM 20 MG/1
20 TABLET, FILM COATED ORAL DAILY
Qty: 90 TABLET | Refills: 3 | Status: SHIPPED | OUTPATIENT
Start: 2022-05-11

## 2022-05-11 RX ORDER — CETIRIZINE HYDROCHLORIDE 10 MG/1
10 TABLET ORAL DAILY
Qty: 90 TABLET | Refills: 1 | Status: SHIPPED | OUTPATIENT
Start: 2022-05-11 | End: 2022-05-11

## 2022-05-11 RX ORDER — ALBUTEROL SULFATE 90 UG/1
AEROSOL, METERED RESPIRATORY (INHALATION)
Qty: 18 G | Refills: 3 | Status: SHIPPED | OUTPATIENT
Start: 2022-05-11

## 2022-05-11 RX ORDER — AMLODIPINE BESYLATE 5 MG/1
5 TABLET ORAL DAILY
Qty: 90 TABLET | Refills: 3 | Status: SHIPPED | OUTPATIENT
Start: 2022-05-11 | End: 2022-05-23 | Stop reason: SDUPTHER

## 2022-05-11 SDOH — ECONOMIC STABILITY: FOOD INSECURITY: WITHIN THE PAST 12 MONTHS, THE FOOD YOU BOUGHT JUST DIDN'T LAST AND YOU DIDN'T HAVE MONEY TO GET MORE.: NEVER TRUE

## 2022-05-11 SDOH — ECONOMIC STABILITY: FOOD INSECURITY: WITHIN THE PAST 12 MONTHS, YOU WORRIED THAT YOUR FOOD WOULD RUN OUT BEFORE YOU GOT MONEY TO BUY MORE.: NEVER TRUE

## 2022-05-11 ASSESSMENT — SOCIAL DETERMINANTS OF HEALTH (SDOH): HOW HARD IS IT FOR YOU TO PAY FOR THE VERY BASICS LIKE FOOD, HOUSING, MEDICAL CARE, AND HEATING?: NOT HARD AT ALL

## 2022-05-11 NOTE — PROGRESS NOTES
Chief Complaint   Patient presents with    Asthma     Pt states she is in office today due to her Asthma has been acting up          ASSESSMENT/PLAN:  1. Moderate persistent asthma with exacerbation  Refill albuterol and advair  Follow up in 2 weeks to gauge improvement   Uncontrolled allergies are causing worsening of symptoms. - albuterol sulfate HFA (VENTOLIN HFA) 108 (90 Base) MCG/ACT inhaler; INHALE 2 PUFFS BY MOUTH EVERY 6 HOURS AS NEEDED FOR WHEEZING OR SHORTNESS OF BREATH  Dispense: 18 g; Refill: 3  - fluticasone-salmeterol (ADVAIR HFA) 230-21 MCG/ACT inhaler; Inhale 2 puffs into the lungs every 12 hours  Dispense: 1 each; Refill: 5    2. Seasonal allergies  Start antihistamine and follow up in 2 weeks to gauge improvement   - cetirizine (ZYRTEC) 10 MG tablet; Take 1 tablet by mouth daily  Dispense: 90 tablet; Refill: 1    3. Class 3 severe obesity due to excess calories with serious comorbidity and body mass index (BMI) of 40.0 to 44.9 in [facility])  Complicating aspects of patient care. Will try to work on dietary changes to improve BP    4. Hypertension, unspecified type  Treat BP and follow up in 2 weeks to gauge improvement and titrate medications. Update renal function at follow up  - amLODIPine (NORVASC) 5 MG tablet; Take 1 tablet by mouth daily  Dispense: 90 tablet; Refill: 3    5. Mixed hyperlipidemia  Treat with statin   Discussed ASCVD  Update lipids at follow up  - atorvastatin (LIPITOR) 20 MG tablet; Take 1 tablet by mouth daily  Dispense: 90 tablet; Refill: 3        HPI:  Jess Alvarez is a 49 y.o. (: 1975) here today for chronic condition mgmt. Wt Readings from Last 3 Encounters:   22 235 lb (106.6 kg)   21 224 lb 9.6 oz (101.9 kg)   10/26/21 225 lb (102.1 kg)   Is gaining wt and she doesn't know why. Discussed BMI, BMR and dietary changes. BP elevated.    BP Readings from Last 3 Encounters:   22 (!) 142/92   21 136/72   10/26/21 135/82     Needs refills of her meds for allergies and asthma. Hyperlipidemia  Patient is  following a low fat, low cholesterol diet. is not exercising regularly. OTC Supplements: none. Lab Results   Component Value Date    ALT 21 10/20/2021    AST 21 10/20/2021     Lab Results   Component Value Date    CHOL 241 (H) 10/20/2021     Lab Results   Component Value Date    TRIG 152 (H) 10/20/2021     Lab Results   Component Value Date    HDL 50 10/20/2021     Lab Results   Component Value Date    LDLCALC 161 (H) 10/20/2021     ASA: no  The 10-year ASCVD risk score (Howard Flanagan, et al., 2013) is: 2.6%    Values used to calculate the score:      Age: 49 years      Sex: Female      Is Non- : No      Diabetic: No      Tobacco smoker: No      Systolic Blood Pressure: 350 mmHg      Is BP treated: Yes      HDL Cholesterol: 50 mg/dL      Total Cholesterol: 241 mg/dL      Review of Systems   Constitutional: Negative for appetite change, chills, fatigue and fever. HENT: Positive for postnasal drip. Negative for congestion. Eyes: Negative for pain and visual disturbance. Respiratory: Positive for cough and wheezing. Negative for shortness of breath. Cardiovascular: Negative for chest pain and palpitations. Gastrointestinal: Negative for abdominal pain, constipation and diarrhea. Genitourinary: Negative for difficulty urinating. Musculoskeletal: Negative for arthralgias. Skin: Negative for rash and wound. Neurological: Negative for dizziness, weakness, light-headedness and headaches. Hematological: Does not bruise/bleed easily. Psychiatric/Behavioral: Negative for behavioral problems. History reviewed. No pertinent past medical history.     Family History   Problem Relation Age of Onset    Hypothyroidism Mother     Asthma Mother     Diabetes Mother     COPD Mother     No Known Problems Father        Social History     Tobacco Use    Smoking status: Former Smoker     Years: 2.00     Quit date:  [facility]      Years since quittin.3    Smokeless tobacco: Never Used   Substance Use Topics    Alcohol use: Not Currently    Drug use: Never       New Prescriptions    AMLODIPINE (NORVASC) 5 MG TABLET    Take 1 tablet by mouth daily    CETIRIZINE (ZYRTEC) 10 MG TABLET    Take 1 tablet by mouth daily   These medications were prescribed by a provider not a part of this encounter    FLUTICASONE-UMECLIDIN-VILANT (TRELEGY ELLIPTA) 100-62.5-25 MCG/INH AEPB    Inhale 1 puff into the lungs daily       Meds Prior to visit:  Current Outpatient Medications on File Prior to Visit   Medication Sig Dispense Refill    montelukast (SINGULAIR) 10 MG tablet       triamterene-hydroCHLOROthiazide (MAXZIDE-25) 37.5-25 MG per tablet Take 1 tablet by mouth daily      BLISOVI FE  1-20 MG-MCG per tablet       Multiple Vitamin (MULTIVITAMIN) tablet Take 1 tablet by mouth daily       No current facility-administered medications on file prior to visit. Allergies   Allergen Reactions    Erythromycin     Penicillins        OBJECTIVE:  BP (!) 142/92   Pulse 84   Temp 96.8 °F (36 °C) (Axillary)   Ht 5' 2\" (1.575 m)   Wt 235 lb (106.6 kg)   LMP 2022 (Exact Date)   Breastfeeding No   BMI 42.98 kg/m²   BP Readings from Last 2 Encounters:   22 (!) 142/92   21 136/72     Wt Readings from Last 3 Encounters:   22 235 lb (106.6 kg)   21 224 lb 9.6 oz (101.9 kg)   10/26/21 225 lb (102.1 kg)       Physical Exam  Vitals reviewed. Constitutional:       General: She is not in acute distress. Appearance: Normal appearance. She is well-developed. She is not ill-appearing. HENT:      Head: Normocephalic and atraumatic. Right Ear: Tympanic membrane, ear canal and external ear normal. There is no impacted cerumen. Left Ear: Tympanic membrane, ear canal and external ear normal. There is no impacted cerumen. Nose: Nose normal. No congestion.       Mouth/Throat:      Mouth: Mucous membranes are moist.      Pharynx: Oropharynx is clear. No oropharyngeal exudate or posterior oropharyngeal erythema. Eyes:      General: No scleral icterus. Right eye: No discharge. Left eye: No discharge. Extraocular Movements: Extraocular movements intact. Conjunctiva/sclera: Conjunctivae normal.      Pupils: Pupils are equal, round, and reactive to light. Cardiovascular:      Rate and Rhythm: Normal rate and regular rhythm. Pulses: Normal pulses. Heart sounds: Normal heart sounds. No murmur heard. Comments: Radial and pedal pulses intact  Pulmonary:      Effort: Pulmonary effort is normal. No respiratory distress. Breath sounds: Normal breath sounds. No stridor. No wheezing, rhonchi or rales. Chest:      Chest wall: No tenderness. Abdominal:      General: Bowel sounds are normal.      Palpations: Abdomen is soft. Tenderness: There is no abdominal tenderness. There is no guarding. Comments: Normal liver and spleen. No organomegaly   Musculoskeletal:         General: No tenderness. Normal range of motion. Cervical back: Normal range of motion and neck supple. Right lower leg: No edema. Left lower leg: No edema. Comments: Intact in all extremities   Lymphadenopathy:      Cervical: No cervical adenopathy. Skin:     General: Skin is warm. Capillary Refill: Capillary refill takes less than 2 seconds. Findings: No erythema or rash. Neurological:      General: No focal deficit present. Mental Status: She is alert and oriented to person, place, and time. Mental status is at baseline. Motor: No weakness or abnormal muscle tone. Coordination: Coordination normal.      Gait: Gait normal.      Deep Tendon Reflexes: Reflexes normal.   Psychiatric:         Mood and Affect: Mood normal.         Behavior: Behavior normal.         Thought Content:  Thought content normal.         Judgment: Judgment normal.         Lab Results   Component Value Date    WBC 11.4 (H) 10/20/2021    HGB 12.5 10/20/2021    HCT 37.8 10/20/2021    MCV 84.1 10/20/2021     10/20/2021     Lab Results   Component Value Date     10/20/2021    K 3.7 10/20/2021     10/20/2021    CO2 26 10/20/2021    BUN 15 10/20/2021    CREATININE 0.6 10/20/2021    GLUCOSE 86 10/20/2021    CALCIUM 8.9 10/20/2021    PROT 6.9 10/20/2021    LABALBU 4.2 10/20/2021    BILITOT 0.4 10/20/2021    ALKPHOS 90 10/20/2021    AST 21 10/20/2021    ALT 21 10/20/2021    LABGLOM >60 10/20/2021    GFRAA >60 10/20/2021    AGRATIO 1.6 10/20/2021    GLOB 2.7 10/20/2021     Lab Results   Component Value Date    CHOL 241 (H) 10/20/2021    CHOL 240 (H) 03/31/2021     Lab Results   Component Value Date    TRIG 152 (H) 10/20/2021    TRIG 154 (H) 03/31/2021     Lab Results   Component Value Date    HDL 50 10/20/2021    HDL 52 03/31/2021     Lab Results   Component Value Date    LDLCALC 161 (H) 10/20/2021    LDLCALC 157 (H) 03/31/2021     Lab Results   Component Value Date    LABVLDL 30 10/20/2021    LABVLDL 31 03/31/2021     No results found for: LABA1C      Discussed use, benefit, and side effects of prescribed medications. Barriers to medication compliance addressed. All patient questions answered. Pt voiced understanding. RTC Return in about 2 weeks (around 5/25/2022).     Future Appointments   Date Time Provider Tameka Julia   5/23/2022  8:45 AM MD Melissa Moe MD  5/15/2022  12:04 PM

## 2022-05-11 NOTE — LETTER
[facility]  [address] [place] [address]  Phone: [phone]  Fax: [phone]    Vladimir Condon MD        May 11, 2022     Patient: Mariana Beth   YOB: 1975   Date of Visit: 5/11/2022       To Whom it May Concern:    Puneet Neely was seen in my clinic on 5/11/2022. She may return to work on 5/12/2022. If you have any questions or concerns, please don't hesitate to call.     Sincerely,         Vladimir Condon MD

## 2022-05-11 NOTE — Clinical Note
[facility]  [address] [place] [address]  Phone: [phone]  Fax: [phone]    Brad Pritchard MD        May 11, 2022     Patient: Yaya Suh   YOB: 1975   Date of Visit: 5/11/2022       To Whom It May Concern: It is my medical opinion that Caro De La Garza {Work release (duty restriction):56444}. If you have any questions or concerns, please don't hesitate to call.     Sincerely,        Brad Pritchard MD

## 2022-05-12 ENCOUNTER — TELEPHONE (OUTPATIENT)
Dept: PRIMARY CARE CLINIC | Age: 47
End: 2022-05-12

## 2022-05-12 DIAGNOSIS — J45.41 MODERATE PERSISTENT ASTHMA WITH EXACERBATION: Primary | ICD-10-CM

## 2022-05-12 NOTE — TELEPHONE ENCOUNTER
fluticasone-salmeterol (ADVAIR HFA) 230-21 MCG/ACT inhaler     Is now over 100 would like to know if there is something cheaper.

## 2022-05-15 PROBLEM — E66.813 CLASS 3 SEVERE OBESITY DUE TO EXCESS CALORIES WITH SERIOUS COMORBIDITY AND BODY MASS INDEX (BMI) OF 40.0 TO 44.9 IN ADULT: Status: ACTIVE | Noted: 2022-05-15

## 2022-05-15 PROBLEM — E66.01 CLASS 3 SEVERE OBESITY DUE TO EXCESS CALORIES WITH SERIOUS COMORBIDITY AND BODY MASS INDEX (BMI) OF 40.0 TO 44.9 IN ADULT (HCC): Status: ACTIVE | Noted: 2022-05-15

## 2022-05-15 PROBLEM — J45.41 MODERATE PERSISTENT ASTHMA WITH EXACERBATION: Status: ACTIVE | Noted: 2022-05-15

## 2022-05-15 PROBLEM — J30.2 SEASONAL ALLERGIES: Status: ACTIVE | Noted: 2022-05-15

## 2022-05-15 ASSESSMENT — ENCOUNTER SYMPTOMS
EYE PAIN: 0
SHORTNESS OF BREATH: 0
WHEEZING: 1
CONSTIPATION: 0
COUGH: 1
DIARRHEA: 0
ABDOMINAL PAIN: 0

## 2022-05-23 ENCOUNTER — OFFICE VISIT (OUTPATIENT)
Dept: PRIMARY CARE CLINIC | Age: 47
End: 2022-05-23
Payer: COMMERCIAL

## 2022-05-23 VITALS
WEIGHT: 236.4 LBS | BODY MASS INDEX: 43.24 KG/M2 | HEART RATE: 100 BPM | TEMPERATURE: 97.8 F | DIASTOLIC BLOOD PRESSURE: 89 MMHG | SYSTOLIC BLOOD PRESSURE: 142 MMHG

## 2022-05-23 DIAGNOSIS — E78.2 MIXED HYPERLIPIDEMIA: ICD-10-CM

## 2022-05-23 DIAGNOSIS — R79.89 ELEVATED TSH: ICD-10-CM

## 2022-05-23 DIAGNOSIS — E66.01 CLASS 3 SEVERE OBESITY DUE TO EXCESS CALORIES WITH SERIOUS COMORBIDITY AND BODY MASS INDEX (BMI) OF 40.0 TO 44.9 IN ADULT (HCC): ICD-10-CM

## 2022-05-23 DIAGNOSIS — J30.2 SEASONAL ALLERGIES: ICD-10-CM

## 2022-05-23 DIAGNOSIS — J45.41 MODERATE PERSISTENT ASTHMA WITH EXACERBATION: Primary | ICD-10-CM

## 2022-05-23 DIAGNOSIS — I10 HYPERTENSION, UNSPECIFIED TYPE: ICD-10-CM

## 2022-05-23 PROCEDURE — 99214 OFFICE O/P EST MOD 30 MIN: CPT | Performed by: FAMILY MEDICINE

## 2022-05-23 RX ORDER — AMLODIPINE BESYLATE 5 MG/1
10 TABLET ORAL DAILY
Qty: 90 TABLET | Refills: 3 | Status: SHIPPED | OUTPATIENT
Start: 2022-05-23 | End: 2022-06-14 | Stop reason: SINTOL

## 2022-05-23 ASSESSMENT — PATIENT HEALTH QUESTIONNAIRE - PHQ9
SUM OF ALL RESPONSES TO PHQ9 QUESTIONS 1 & 2: 0
2. FEELING DOWN, DEPRESSED OR HOPELESS: 0
SUM OF ALL RESPONSES TO PHQ QUESTIONS 1-9: 0
1. LITTLE INTEREST OR PLEASURE IN DOING THINGS: 0

## 2022-05-23 ASSESSMENT — ENCOUNTER SYMPTOMS
SHORTNESS OF BREATH: 0
DIARRHEA: 0
CONSTIPATION: 0
EYE PAIN: 0
ABDOMINAL PAIN: 0
COUGH: 0
WHEEZING: 0

## 2022-05-23 NOTE — PROGRESS NOTES
Chief Complaint   Patient presents with    Asthma    Hypertension    Allergies         ASSESSMENT/PLAN:  1. Moderate persistent asthma with exacerbation  Doing much better on trelegy  Controlled  Continue regimen and allergy treatment. Follow up in 3 months to continue to monitor    2. Seasonal allergies  Doing better and controlled on zyrtec  Follow up in 3 months to continue to monitor, sooner if need be    3. Class 3 severe obesity due to excess calories with serious comorbidity and body mass index (BMI) of 40.0 to 44.9 in adult [facility])  Discussed Adipex. Update labs  Follow up in 1 weeks to check BP once more to make sure it is normal on increased dose of norvasc. Complicating all aspects of patient's care  - Hemoglobin A1C; Future    4. Hypertension, unspecified type  Above goal   Increase amlodipine   Follow up in 1 weeks to gauge improvement in order to start adipex  - Comprehensive Metabolic Panel; Future  - CBC with Auto Differential; Future  - amLODIPine (NORVASC) 5 MG tablet; Take 2 tablets by mouth daily  Dispense: 90 tablet; Refill: 3    5. Mixed hyperlipidemia  Update and treat accordingly  - Lipid Panel; Future    6. Elevated TSH  Update and treat accordingly  - TSH with Reflex; Future         HPI:  hSawanda Paulino is a 49 y.o. (: 1975) here today for chronic condition mgmt. Asthma  Started trelegy LOV  Continued the alb PRN - now she is not using her albuterol at all. Allergies  Started zyrtec LOV  Today she states she is feeling like she wakes up with a clear nose. Started amlodpine LOV for HTN  BP Readings from Last 3 Encounters:   22 (!) 142/89   22 (!) 142/92   21 136/72       Wt Readings from Last 3 Encounters:   22 236 lb 6.4 oz (107.2 kg)   22 235 lb (106.6 kg)   21 224 lb 9.6 oz (101.9 kg)   Discussed lifestyle changes  Would like to start Adipex. Hyperlipidemia  Patient is not following a low fat, low cholesterol diet.  is not exercising regularly. OTC Supplements: statin. Lab Results   Component Value Date    ALT 21 10/20/2021    AST 21 10/20/2021     Lab Results   Component Value Date    CHOL 241 (H) 10/20/2021     Lab Results   Component Value Date    TRIG 152 (H) 10/20/2021     Lab Results   Component Value Date    HDL 50 10/20/2021     Lab Results   Component Value Date    LDLCALC 161 (H) 10/20/2021   ASA: no  The 10-year ASCVD risk score (Gertrudis Osorio, et al., 2013) is: 2.6%    Values used to calculate the score:      Age: 49 years      Sex: Female      Is Non- : No      Diabetic: No      Tobacco smoker: No      Systolic Blood Pressure: 381 mmHg      Is BP treated: Yes      HDL Cholesterol: 50 mg/dL      Total Cholesterol: 241 mg/dL    History of abnormal TSH  Feels a bit tired lately. Will update. Review of Systems   Constitutional: Negative for appetite change, chills, fatigue and fever. HENT: Negative for congestion and postnasal drip. Eyes: Negative for pain and visual disturbance. Respiratory: Negative for cough, shortness of breath and wheezing. Cardiovascular: Negative for chest pain and palpitations. Gastrointestinal: Negative for abdominal pain, constipation and diarrhea. Genitourinary: Negative for difficulty urinating. Musculoskeletal: Negative for arthralgias. Skin: Negative for rash and wound. Neurological: Negative for dizziness, weakness, light-headedness and headaches. Hematological: Does not bruise/bleed easily. Psychiatric/Behavioral: Negative for behavioral problems. History reviewed. No pertinent past medical history.     Family History   Problem Relation Age of Onset    Hypothyroidism Mother     Asthma Mother     Diabetes Mother     COPD Mother     No Known Problems Father        Social History     Tobacco Use    Smoking status: Former Smoker     Years: 2.00     Quit date:      Years since quittin.4    Smokeless tobacco: Never Used Substance Use Topics    Alcohol use: Not Currently    Drug use: Never       New Prescriptions    No medications on file       Meds Prior to visit:  Current Outpatient Medications on File Prior to Visit   Medication Sig Dispense Refill    NONFORMULARY besylate      fluticasone-umeclidin-vilant (TRELEGY ELLIPTA) 100-62.5-25 MCG/INH AEPB Inhale 1 puff into the lungs daily 60 each 3    atorvastatin (LIPITOR) 20 MG tablet Take 1 tablet by mouth daily 90 tablet 3    albuterol sulfate HFA (VENTOLIN HFA) 108 (90 Base) MCG/ACT inhaler INHALE 2 PUFFS BY MOUTH EVERY 6 HOURS AS NEEDED FOR WHEEZING OR SHORTNESS OF BREATH 18 g 3    cetirizine (ZYRTEC) 10 MG tablet Take 1 tablet by mouth daily 90 tablet 1    montelukast (SINGULAIR) 10 MG tablet       triamterene-hydroCHLOROthiazide (MAXZIDE-25) 37.5-25 MG per tablet Take 1 tablet by mouth daily      BLISOVI FE 1/20 1-20 MG-MCG per tablet       Multiple Vitamin (MULTIVITAMIN) tablet Take 1 tablet by mouth daily       No current facility-administered medications on file prior to visit. Allergies   Allergen Reactions    Erythromycin     Penicillins        OBJECTIVE:  BP (!) 142/89   Pulse 100   Temp 97.8 °F (36.6 °C) (Temporal)   Wt 236 lb 6.4 oz (107.2 kg)   LMP 05/09/2022 (Exact Date)   BMI 43.24 kg/m²   BP Readings from Last 2 Encounters:   05/23/22 (!) 142/89   05/11/22 (!) 142/92     Wt Readings from Last 3 Encounters:   05/23/22 236 lb 6.4 oz (107.2 kg)   05/11/22 235 lb (106.6 kg)   12/27/21 224 lb 9.6 oz (101.9 kg)       Physical Exam  Vitals reviewed. Constitutional:       General: She is not in acute distress. Appearance: Normal appearance. She is well-developed. She is obese. She is not ill-appearing. HENT:      Head: Normocephalic and atraumatic. Right Ear: Tympanic membrane, ear canal and external ear normal. There is no impacted cerumen.       Left Ear: Tympanic membrane, ear canal and external ear normal. There is no impacted cerumen. Nose: Nose normal. No congestion. Mouth/Throat:      Mouth: Mucous membranes are moist.      Pharynx: Oropharynx is clear. No oropharyngeal exudate or posterior oropharyngeal erythema. Eyes:      General: No scleral icterus. Right eye: No discharge. Left eye: No discharge. Extraocular Movements: Extraocular movements intact. Conjunctiva/sclera: Conjunctivae normal.      Pupils: Pupils are equal, round, and reactive to light. Cardiovascular:      Rate and Rhythm: Normal rate and regular rhythm. Pulses: Normal pulses. Heart sounds: Normal heart sounds. No murmur heard. Comments: Radial and pedal pulses intact  Pulmonary:      Effort: Pulmonary effort is normal. No respiratory distress. Breath sounds: Normal breath sounds. No stridor. No wheezing, rhonchi or rales. Chest:      Chest wall: No tenderness. Abdominal:      General: Bowel sounds are normal.      Palpations: Abdomen is soft. Tenderness: There is no abdominal tenderness. There is no guarding. Comments: Normal liver and spleen. No organomegaly   Musculoskeletal:         General: No tenderness. Normal range of motion. Cervical back: Normal range of motion and neck supple. Right lower leg: No edema. Left lower leg: No edema. Comments: Intact in all extremities   Lymphadenopathy:      Cervical: No cervical adenopathy. Skin:     General: Skin is warm. Capillary Refill: Capillary refill takes less than 2 seconds. Findings: No erythema or rash. Neurological:      General: No focal deficit present. Mental Status: She is alert and oriented to person, place, and time. Mental status is at baseline. Motor: No weakness or abnormal muscle tone.       Coordination: Coordination normal.      Gait: Gait normal.      Deep Tendon Reflexes: Reflexes normal.   Psychiatric:         Mood and Affect: Mood normal.         Behavior: Behavior normal. Thought Content: Thought content normal.         Judgment: Judgment normal.         Lab Results   Component Value Date    WBC 11.4 (H) 10/20/2021    HGB 12.5 10/20/2021    HCT 37.8 10/20/2021    MCV 84.1 10/20/2021     10/20/2021     Lab Results   Component Value Date     10/20/2021    K 3.7 10/20/2021     10/20/2021    CO2 26 10/20/2021    BUN 15 10/20/2021    CREATININE 0.6 10/20/2021    GLUCOSE 86 10/20/2021    CALCIUM 8.9 10/20/2021    PROT 6.9 10/20/2021    LABALBU 4.2 10/20/2021    BILITOT 0.4 10/20/2021    ALKPHOS 90 10/20/2021    AST 21 10/20/2021    ALT 21 10/20/2021    LABGLOM >60 10/20/2021    GFRAA >60 10/20/2021    AGRATIO 1.6 10/20/2021    GLOB 2.7 10/20/2021     Lab Results   Component Value Date    CHOL 241 (H) 10/20/2021    CHOL 240 (H) 03/31/2021     Lab Results   Component Value Date    TRIG 152 (H) 10/20/2021    TRIG 154 (H) 03/31/2021     Lab Results   Component Value Date    HDL 50 10/20/2021    HDL 52 03/31/2021     Lab Results   Component Value Date    LDLCALC 161 (H) 10/20/2021    LDLCALC 157 (H) 03/31/2021     Lab Results   Component Value Date    LABVLDL 30 10/20/2021    LABVLDL 31 03/31/2021     No results found for: LABA1C      Discussed use, benefit, and side effects of prescribed medications. Barriers to medication compliance addressed. All patient questions answered. Pt voiced understanding. RTC Return in about 9 days (around 6/1/2022), or if symptoms worsen or fail to improve. No future appointments.     Ildefonso Deluca MD  5/23/2022  9:13 AM

## 2022-05-24 DIAGNOSIS — R79.89 ELEVATED TSH: ICD-10-CM

## 2022-05-24 DIAGNOSIS — E78.2 MIXED HYPERLIPIDEMIA: ICD-10-CM

## 2022-05-24 DIAGNOSIS — E66.01 CLASS 3 SEVERE OBESITY DUE TO EXCESS CALORIES WITH SERIOUS COMORBIDITY AND BODY MASS INDEX (BMI) OF 40.0 TO 44.9 IN ADULT (HCC): ICD-10-CM

## 2022-05-24 DIAGNOSIS — I10 HYPERTENSION, UNSPECIFIED TYPE: ICD-10-CM

## 2022-05-24 LAB
A/G RATIO: 1.6 (ref 1.1–2.2)
ALBUMIN SERPL-MCNC: 4.7 G/DL (ref 3.4–5)
ALP BLD-CCNC: 115 U/L (ref 40–129)
ALT SERPL-CCNC: 21 U/L (ref 10–40)
ANION GAP SERPL CALCULATED.3IONS-SCNC: 18 MMOL/L (ref 3–16)
AST SERPL-CCNC: 24 U/L (ref 15–37)
BASOPHILS ABSOLUTE: 0.1 K/UL (ref 0–0.2)
BASOPHILS RELATIVE PERCENT: 0.9 %
BILIRUB SERPL-MCNC: 0.4 MG/DL (ref 0–1)
BUN BLDV-MCNC: 15 MG/DL (ref 7–20)
CALCIUM SERPL-MCNC: 10.1 MG/DL (ref 8.3–10.6)
CHLORIDE BLD-SCNC: 100 MMOL/L (ref 99–110)
CHOLESTEROL, TOTAL: 190 MG/DL (ref 0–199)
CO2: 22 MMOL/L (ref 21–32)
CREAT SERPL-MCNC: 0.7 MG/DL (ref 0.6–1.1)
EOSINOPHILS ABSOLUTE: 0.3 K/UL (ref 0–0.6)
EOSINOPHILS RELATIVE PERCENT: 2.8 %
GFR AFRICAN AMERICAN: >60
GFR NON-AFRICAN AMERICAN: >60
GLUCOSE BLD-MCNC: 91 MG/DL (ref 70–99)
HCT VFR BLD CALC: 40.3 % (ref 36–48)
HDLC SERPL-MCNC: 59 MG/DL (ref 40–60)
HEMOGLOBIN: 13.6 G/DL (ref 12–16)
LDL CHOLESTEROL CALCULATED: 100 MG/DL
LYMPHOCYTES ABSOLUTE: 3.2 K/UL (ref 1–5.1)
LYMPHOCYTES RELATIVE PERCENT: 29.4 %
MCH RBC QN AUTO: 28.1 PG (ref 26–34)
MCHC RBC AUTO-ENTMCNC: 33.9 G/DL (ref 31–36)
MCV RBC AUTO: 83 FL (ref 80–100)
MONOCYTES ABSOLUTE: 0.7 K/UL (ref 0–1.3)
MONOCYTES RELATIVE PERCENT: 6.6 %
NEUTROPHILS ABSOLUTE: 6.5 K/UL (ref 1.7–7.7)
NEUTROPHILS RELATIVE PERCENT: 60.3 %
PDW BLD-RTO: 13.9 % (ref 12.4–15.4)
PLATELET # BLD: 265 K/UL (ref 135–450)
PMV BLD AUTO: 9.3 FL (ref 5–10.5)
POTASSIUM SERPL-SCNC: 3.8 MMOL/L (ref 3.5–5.1)
RBC # BLD: 4.85 M/UL (ref 4–5.2)
SODIUM BLD-SCNC: 140 MMOL/L (ref 136–145)
T4 FREE: 1.2 NG/DL (ref 0.9–1.8)
TOTAL PROTEIN: 7.7 G/DL (ref 6.4–8.2)
TRIGL SERPL-MCNC: 154 MG/DL (ref 0–150)
TSH REFLEX: 4.83 UIU/ML (ref 0.27–4.2)
VLDLC SERPL CALC-MCNC: 31 MG/DL
WBC # BLD: 10.8 K/UL (ref 4–11)

## 2022-05-25 LAB
ESTIMATED AVERAGE GLUCOSE: 108.3 MG/DL
HBA1C MFR BLD: 5.4 %

## 2022-06-02 ENCOUNTER — PATIENT MESSAGE (OUTPATIENT)
Dept: PRIMARY CARE CLINIC | Age: 47
End: 2022-06-02

## 2022-06-14 ENCOUNTER — OFFICE VISIT (OUTPATIENT)
Dept: PRIMARY CARE CLINIC | Age: 47
End: 2022-06-14
Payer: COMMERCIAL

## 2022-06-14 VITALS
WEIGHT: 235.2 LBS | SYSTOLIC BLOOD PRESSURE: 144 MMHG | BODY MASS INDEX: 43.02 KG/M2 | TEMPERATURE: 97 F | HEART RATE: 100 BPM | DIASTOLIC BLOOD PRESSURE: 96 MMHG

## 2022-06-14 DIAGNOSIS — M54.50 ACUTE BILATERAL LOW BACK PAIN WITHOUT SCIATICA: ICD-10-CM

## 2022-06-14 DIAGNOSIS — E78.2 HYPERLIPIDEMIA, MIXED: ICD-10-CM

## 2022-06-14 DIAGNOSIS — I10 HYPERTENSION, UNSPECIFIED TYPE: ICD-10-CM

## 2022-06-14 DIAGNOSIS — E66.01 CLASS 3 SEVERE OBESITY DUE TO EXCESS CALORIES WITH SERIOUS COMORBIDITY AND BODY MASS INDEX (BMI) OF 40.0 TO 44.9 IN ADULT (HCC): Primary | ICD-10-CM

## 2022-06-14 PROCEDURE — 99214 OFFICE O/P EST MOD 30 MIN: CPT | Performed by: FAMILY MEDICINE

## 2022-06-14 RX ORDER — METHYLPREDNISOLONE 4 MG/1
TABLET ORAL
Qty: 1 KIT | Refills: 0 | Status: SHIPPED | OUTPATIENT
Start: 2022-06-14 | End: 2022-06-20

## 2022-06-14 RX ORDER — CYCLOBENZAPRINE HCL 5 MG
5 TABLET ORAL 2 TIMES DAILY PRN
Qty: 20 TABLET | Refills: 0 | Status: SHIPPED | OUTPATIENT
Start: 2022-06-14 | End: 2022-06-24

## 2022-06-14 RX ORDER — HYDROCHLOROTHIAZIDE 12.5 MG/1
12.5 CAPSULE, GELATIN COATED ORAL EVERY MORNING
Qty: 90 CAPSULE | Refills: 3 | Status: SHIPPED | OUTPATIENT
Start: 2022-06-14 | End: 2023-06-09

## 2022-06-14 ASSESSMENT — ENCOUNTER SYMPTOMS
SHORTNESS OF BREATH: 0
ABDOMINAL PAIN: 0
COUGH: 0
DIARRHEA: 0
WHEEZING: 0
EYE PAIN: 0
CONSTIPATION: 0

## 2022-06-14 NOTE — PROGRESS NOTES
Chief Complaint   Patient presents with    Asthma    Hypertension         ASSESSMENT/PLAN:  1. Class 3 severe obesity due to excess calories with serious comorbidity and body mass index (BMI) of 40.0 to 44.9 in adult [facility])  Follow up in one week to gauge improvement in BP  If normal, will start adipex for 12 weeks. 2. Hypertension, unspecified type  Continue with triamterene but add more of the HCTZ component. She is swelling too much on norvasc  Will follow up in one week to monitor and if normal, will start 12 weeks to adipex. If elevated will add restart norvasc but only 5 mg  - hydroCHLOROthiazide (MICROZIDE) 12.5 MG capsule; Take 1 capsule by mouth every morning  Dispense: 90 capsule; Refill: 3    3. Acute bilateral low back pain without sciatica  Lifting heavy object at work   FROM intact and not red flags  Try medrol dosepack and flexeril. Follow up PRN   =already has HEP from previous PT and this is helping.  - methylPREDNISolone (MEDROL DOSEPACK) 4 MG tablet; Take by mouth. Dispense: 1 kit; Refill: 0  - cyclobenzaprine (FLEXERIL) 5 MG tablet; Take 1 tablet by mouth 2 times daily as needed for Muscle spasms  Dispense: 20 tablet; Refill: 0        HPI:  Jesenia Patrick is a 49 y.o. (: 1975) here today for chronic condition mgmt. Started amlodpine LOV for HTN  Already on triamterene   Too much swelling in lower extremities and is on feel all day at work  It is not working for her  BP Readings from Last 3 Encounters:   22 (!) 144/96   22 (!) 142/89   22 (!) 142/92       Wt Readings from Last 3 Encounters:   22 235 lb 3.2 oz (106.7 kg)   22 236 lb 6.4 oz (107.2 kg)   22 235 lb (106.6 kg)   Discussed lifestyle changes  Would like to start Adipex. Hyperlipidemia  Patient is following a low fat, low cholesterol diet. is not exercising regularly. OTC Supplements: statin.   Lab Results   Component Value Date    ALT 2022    AST 2022     Lab Results   Component Value Date    CHOL 190 05/24/2022     Lab Results   Component Value Date    TRIG 154 (H) 05/24/2022     Lab Results   Component Value Date    HDL 59 05/24/2022     Lab Results   Component Value Date    LDLCALC 100 (H) 05/24/2022   ASA: no  The 10-year ASCVD risk score (Eboni Foss et al., 2013) is: 1.5%    Values used to calculate the score:      Age: 49 years      Sex: Female      Is Non- : No      Diabetic: No      Tobacco smoker: No      Systolic Blood Pressure: 117 mmHg      Is BP treated: Yes      HDL Cholesterol: 59 mg/dL      Total Cholesterol: 190 mg/dL    Hurt back at work, lifting something heavy  She is feeling a bit better by taking ibuprofen and using HEP from previous PT   FROM intact  She is able to move but going from sitting to standing is painful  No incontinence, numbness or tingling or weakness of legs. No radiation of pain. Review of Systems   Constitutional: Negative for appetite change, chills, fatigue and fever. HENT: Negative for congestion and postnasal drip. Eyes: Negative for pain and visual disturbance. Respiratory: Negative for cough, shortness of breath and wheezing. Cardiovascular: Negative for chest pain and palpitations. Gastrointestinal: Negative for abdominal pain, constipation and diarrhea. Genitourinary: Negative for difficulty urinating. Musculoskeletal: Negative for arthralgias. Skin: Negative for rash and wound. Neurological: Negative for dizziness, weakness, light-headedness and headaches. Hematological: Does not bruise/bleed easily. Psychiatric/Behavioral: Negative for behavioral problems. History reviewed. No pertinent past medical history.     Family History   Problem Relation Age of Onset    Hypothyroidism Mother     Asthma Mother     Diabetes Mother     COPD Mother     No Known Problems Father        Social History     Tobacco Use    Smoking status: Former Smoker     Years: 2.00 Quit date: 0     Years since quittin.4    Smokeless tobacco: Never Used   Substance Use Topics    Alcohol use: Not Currently    Drug use: Never       New Prescriptions    CYCLOBENZAPRINE (FLEXERIL) 5 MG TABLET    Take 1 tablet by mouth 2 times daily as needed for Muscle spasms    HYDROCHLOROTHIAZIDE (MICROZIDE) 12.5 MG CAPSULE    Take 1 capsule by mouth every morning    METHYLPREDNISOLONE (MEDROL DOSEPACK) 4 MG TABLET    Take by mouth. Meds Prior to visit:  Current Outpatient Medications on File Prior to Visit   Medication Sig Dispense Refill    NONFORMULARY besylate      fluticasone-umeclidin-vilant (TRELEGY ELLIPTA) 100-62.5-25 MCG/INH AEPB Inhale 1 puff into the lungs daily 60 each 3    atorvastatin (LIPITOR) 20 MG tablet Take 1 tablet by mouth daily 90 tablet 3    albuterol sulfate HFA (VENTOLIN HFA) 108 (90 Base) MCG/ACT inhaler INHALE 2 PUFFS BY MOUTH EVERY 6 HOURS AS NEEDED FOR WHEEZING OR SHORTNESS OF BREATH 18 g 3    cetirizine (ZYRTEC) 10 MG tablet Take 1 tablet by mouth daily 90 tablet 1    montelukast (SINGULAIR) 10 MG tablet       triamterene-hydroCHLOROthiazide (MAXZIDE-25) 37.5-25 MG per tablet Take 1 tablet by mouth daily      BLISOVI FE  1-20 MG-MCG per tablet       Multiple Vitamin (MULTIVITAMIN) tablet Take 1 tablet by mouth daily       No current facility-administered medications on file prior to visit. Allergies   Allergen Reactions    Erythromycin     Penicillins        OBJECTIVE:  BP (!) 144/96   Pulse 100   Temp 97 °F (36.1 °C) (Temporal)   Wt 235 lb 3.2 oz (106.7 kg)   LMP 2022   BMI 43.02 kg/m²   BP Readings from Last 2 Encounters:   22 (!) 144/96   22 (!) 142/89     Wt Readings from Last 3 Encounters:   22 235 lb 3.2 oz (106.7 kg)   22 236 lb 6.4 oz (107.2 kg)   22 235 lb (106.6 kg)       Physical Exam  Vitals reviewed. Constitutional:       General: She is not in acute distress.      Appearance: Normal appearance. She is well-developed. She is obese. She is not ill-appearing. HENT:      Head: Normocephalic and atraumatic. Right Ear: Tympanic membrane, ear canal and external ear normal. There is no impacted cerumen. Left Ear: Tympanic membrane, ear canal and external ear normal. There is no impacted cerumen. Nose: Nose normal. No congestion. Mouth/Throat:      Mouth: Mucous membranes are moist.      Pharynx: Oropharynx is clear. No oropharyngeal exudate or posterior oropharyngeal erythema. Eyes:      General: No scleral icterus. Right eye: No discharge. Left eye: No discharge. Extraocular Movements: Extraocular movements intact. Conjunctiva/sclera: Conjunctivae normal.      Pupils: Pupils are equal, round, and reactive to light. Cardiovascular:      Rate and Rhythm: Normal rate and regular rhythm. Pulses: Normal pulses. Heart sounds: Normal heart sounds. No murmur heard. Comments: Radial and pedal pulses intact  Pulmonary:      Effort: Pulmonary effort is normal. No respiratory distress. Breath sounds: Normal breath sounds. No stridor. No wheezing, rhonchi or rales. Chest:      Chest wall: No tenderness. Abdominal:      General: Bowel sounds are normal.      Palpations: Abdomen is soft. Tenderness: There is no abdominal tenderness. There is no guarding. Comments: Normal liver and spleen. No organomegaly   Musculoskeletal:         General: No tenderness. Normal range of motion. Cervical back: Normal range of motion and neck supple. Right lower leg: No edema. Left lower leg: No edema. Comments: Intact in all extremities   Lymphadenopathy:      Cervical: No cervical adenopathy. Skin:     General: Skin is warm. Capillary Refill: Capillary refill takes less than 2 seconds. Findings: No erythema or rash. Neurological:      General: No focal deficit present.       Mental Status: She is alert and oriented to person, place, and time. Mental status is at baseline. Motor: No weakness or abnormal muscle tone. Coordination: Coordination normal.      Gait: Gait normal.      Deep Tendon Reflexes: Reflexes normal.   Psychiatric:         Mood and Affect: Mood normal.         Behavior: Behavior normal.         Thought Content: Thought content normal.         Judgment: Judgment normal.         Lab Results   Component Value Date    WBC 10.8 05/24/2022    HGB 13.6 05/24/2022    HCT 40.3 05/24/2022    MCV 83.0 05/24/2022     05/24/2022     Lab Results   Component Value Date     05/24/2022    K 3.8 05/24/2022     05/24/2022    CO2 22 05/24/2022    BUN 15 05/24/2022    CREATININE 0.7 05/24/2022    GLUCOSE 91 05/24/2022    CALCIUM 10.1 05/24/2022    PROT 7.7 05/24/2022    LABALBU 4.7 05/24/2022    BILITOT 0.4 05/24/2022    ALKPHOS 115 05/24/2022    AST 24 05/24/2022    ALT 21 05/24/2022    LABGLOM >60 05/24/2022    GFRAA >60 05/24/2022    AGRATIO 1.6 05/24/2022    GLOB 2.7 10/20/2021     Lab Results   Component Value Date    CHOL 190 05/24/2022    CHOL 241 (H) 10/20/2021    CHOL 240 (H) 03/31/2021     Lab Results   Component Value Date    TRIG 154 (H) 05/24/2022    TRIG 152 (H) 10/20/2021    TRIG 154 (H) 03/31/2021     Lab Results   Component Value Date    HDL 59 05/24/2022    HDL 50 10/20/2021    HDL 52 03/31/2021     Lab Results   Component Value Date    LDLCALC 100 (H) 05/24/2022    LDLCALC 161 (H) 10/20/2021    LDLCALC 157 (H) 03/31/2021     Lab Results   Component Value Date    LABVLDL 31 05/24/2022    LABVLDL 30 10/20/2021    LABVLDL 31 03/31/2021     Lab Results   Component Value Date    LABA1C 5.4 05/24/2022         Discussed use, benefit, and side effects of prescribed medications. Barriers to medication compliance addressed. All patient questions answered. Pt voiced understanding. RTC Return in about 1 week (around 6/21/2022).     Future Appointments   Date Time Provider Tameka Dumont 6/24/2022  8:00 AM MD Mateus Love MD  6/14/2022  9:43 PM

## 2022-06-29 ENCOUNTER — OFFICE VISIT (OUTPATIENT)
Dept: PRIMARY CARE CLINIC | Age: 47
End: 2022-06-29
Payer: COMMERCIAL

## 2022-06-29 VITALS
HEART RATE: 97 BPM | SYSTOLIC BLOOD PRESSURE: 125 MMHG | WEIGHT: 239.6 LBS | DIASTOLIC BLOOD PRESSURE: 85 MMHG | BODY MASS INDEX: 43.82 KG/M2 | TEMPERATURE: 97.6 F

## 2022-06-29 DIAGNOSIS — E78.2 MIXED HYPERLIPIDEMIA: ICD-10-CM

## 2022-06-29 DIAGNOSIS — M54.50 ACUTE BILATERAL LOW BACK PAIN WITHOUT SCIATICA: ICD-10-CM

## 2022-06-29 DIAGNOSIS — I10 HYPERTENSION, UNSPECIFIED TYPE: ICD-10-CM

## 2022-06-29 DIAGNOSIS — E66.01 CLASS 3 SEVERE OBESITY DUE TO EXCESS CALORIES WITH SERIOUS COMORBIDITY AND BODY MASS INDEX (BMI) OF 40.0 TO 44.9 IN ADULT (HCC): Primary | ICD-10-CM

## 2022-06-29 PROCEDURE — 99214 OFFICE O/P EST MOD 30 MIN: CPT | Performed by: FAMILY MEDICINE

## 2022-06-29 RX ORDER — PHENTERMINE HYDROCHLORIDE 37.5 MG/1
37.5 TABLET ORAL
Qty: 30 TABLET | Refills: 0 | Status: SHIPPED | OUTPATIENT
Start: 2022-06-29 | End: 2022-07-25 | Stop reason: SDUPTHER

## 2022-06-29 NOTE — PROGRESS NOTES
Chief Complaint   Patient presents with    Blood Pressure Check         ASSESSMENT/PLAN:  1. Class 3 severe obesity due to excess calories with serious comorbidity and body mass index (BMI) of 40.0 to 44.9 in adult [facility])  Complicating all aspects of patient care. PDMP reviewed- No suspicious activity  Follow up in 3 months to continue to monitor  Stable and doing well on regimen. Starting 12 weeks of phentermine today--> 2022  Will continue if she is down 4-5 lbs in first month. - phentermine (ADIPEX-P) 37.5 MG tablet; Take 1 tablet by mouth every morning (before breakfast) for 30 days. Dispense: 30 tablet; Refill: 0    2. Hypertension, unspecified type  Controlled on regimen   Labs UTD    3. Acute bilateral low back pain without sciatica  Improving with HEP  Wt loss will help    4. Mixed hyperlipidemia  Continue med regimen. Will hopefully improve with wt loss. HPI:  Braden Denise is a 49 y.o. (: 1975) here today for wt mgmt. Wt Readings from Last 3 Encounters:   22 239 lb 9.6 oz (108.7 kg)   22 235 lb 3.2 oz (106.7 kg)   22 236 lb 6.4 oz (107.2 kg)     BP Readings from Last 3 Encounters:   22 125/85   22 (!) 144/96   22 (!) 142/89     Discussed wt loss medication in depth. She is ready to start her 12 weeks. Dietary habits are under control per patient. Review of Systems    History reviewed. No pertinent past medical history.     Family History   Problem Relation Age of Onset    Hypothyroidism Mother     Asthma Mother     Diabetes Mother     COPD Mother     No Known Problems Father        Social History     Tobacco Use    Smoking status: Former Smoker     Years: 2.00     Quit date:      Years since quittin.5    Smokeless tobacco: Never Used   Substance Use Topics    Alcohol use: Not Currently    Drug use: Never       New Prescriptions    PHENTERMINE (ADIPEX-P) 37.5 MG TABLET    Take 1 tablet by mouth every morning (before breakfast) for 30 days. Meds Prior to visit:  Current Outpatient Medications on File Prior to Visit   Medication Sig Dispense Refill    hydroCHLOROthiazide (MICROZIDE) 12.5 MG capsule Take 1 capsule by mouth every morning 90 capsule 3    NONFORMULARY besylate      fluticasone-umeclidin-vilant (TRELEGY ELLIPTA) 100-62.5-25 MCG/INH AEPB Inhale 1 puff into the lungs daily 60 each 3    atorvastatin (LIPITOR) 20 MG tablet Take 1 tablet by mouth daily 90 tablet 3    albuterol sulfate HFA (VENTOLIN HFA) 108 (90 Base) MCG/ACT inhaler INHALE 2 PUFFS BY MOUTH EVERY 6 HOURS AS NEEDED FOR WHEEZING OR SHORTNESS OF BREATH 18 g 3    cetirizine (ZYRTEC) 10 MG tablet Take 1 tablet by mouth daily 90 tablet 1    montelukast (SINGULAIR) 10 MG tablet       triamterene-hydroCHLOROthiazide (MAXZIDE-25) 37.5-25 MG per tablet Take 1 tablet by mouth daily      BLISOVI FE 1/20 1-20 MG-MCG per tablet       Multiple Vitamin (MULTIVITAMIN) tablet Take 1 tablet by mouth daily       No current facility-administered medications on file prior to visit.      Allergies   Allergen Reactions    Erythromycin     Penicillins        OBJECTIVE:  /85   Pulse 97   Temp 97.6 °F (36.4 °C) (Temporal)   Wt 239 lb 9.6 oz (108.7 kg)   LMP 06/07/2022   Breastfeeding No   BMI 43.82 kg/m²   BP Readings from Last 2 Encounters:   06/29/22 125/85   06/14/22 (!) 144/96     Wt Readings from Last 3 Encounters:   06/29/22 239 lb 9.6 oz (108.7 kg)   06/14/22 235 lb 3.2 oz (106.7 kg)   05/23/22 236 lb 6.4 oz (107.2 kg)       Physical Exam    Lab Results   Component Value Date    WBC 10.8 05/24/2022    HGB 13.6 05/24/2022    HCT 40.3 05/24/2022    MCV 83.0 05/24/2022     05/24/2022     Lab Results   Component Value Date     05/24/2022    K 3.8 05/24/2022     05/24/2022    CO2 22 05/24/2022    BUN 15 05/24/2022    CREATININE 0.7 05/24/2022    GLUCOSE 91 05/24/2022    CALCIUM 10.1 05/24/2022    PROT 7.7 05/24/2022    LABALBU 4.7 05/24/2022    BILITOT 0.4 05/24/2022    ALKPHOS 115 05/24/2022    AST 24 05/24/2022    ALT 21 05/24/2022    LABGLOM >60 05/24/2022    GFRAA >60 05/24/2022    AGRATIO 1.6 05/24/2022    GLOB 2.7 10/20/2021     Lab Results   Component Value Date    CHOL 190 05/24/2022    CHOL 241 (H) 10/20/2021    CHOL 240 (H) 03/31/2021     Lab Results   Component Value Date    TRIG 154 (H) 05/24/2022    TRIG 152 (H) 10/20/2021    TRIG 154 (H) 03/31/2021     Lab Results   Component Value Date    HDL 59 05/24/2022    HDL 50 10/20/2021    HDL 52 03/31/2021     Lab Results   Component Value Date    LDLCALC 100 (H) 05/24/2022    LDLCALC 161 (H) 10/20/2021    LDLCALC 157 (H) 03/31/2021     Lab Results   Component Value Date    LABVLDL 31 05/24/2022    LABVLDL 30 10/20/2021    LABVLDL 31 03/31/2021     Lab Results   Component Value Date    LABA1C 5.4 05/24/2022         Discussed use, benefit, and side effects of prescribed medications. Barriers to medication compliance addressed. All patient questions answered. Pt voiced understanding. RTC Return in about 4 weeks (around 7/27/2022).     Future Appointments   Date Time Provider Tameka Dumont   7/25/2022  8:45 AM MD Marissa Morrison MD  6/29/2022  9:29 AM

## 2022-07-25 ENCOUNTER — OFFICE VISIT (OUTPATIENT)
Dept: PRIMARY CARE CLINIC | Age: 47
End: 2022-07-25
Payer: COMMERCIAL

## 2022-07-25 VITALS
WEIGHT: 220.8 LBS | SYSTOLIC BLOOD PRESSURE: 125 MMHG | BODY MASS INDEX: 40.38 KG/M2 | HEART RATE: 114 BPM | TEMPERATURE: 95.1 F | DIASTOLIC BLOOD PRESSURE: 89 MMHG

## 2022-07-25 DIAGNOSIS — I10 HYPERTENSION, UNSPECIFIED TYPE: ICD-10-CM

## 2022-07-25 DIAGNOSIS — E66.01 CLASS 3 SEVERE OBESITY DUE TO EXCESS CALORIES WITH SERIOUS COMORBIDITY AND BODY MASS INDEX (BMI) OF 40.0 TO 44.9 IN ADULT (HCC): Primary | ICD-10-CM

## 2022-07-25 PROCEDURE — 99214 OFFICE O/P EST MOD 30 MIN: CPT | Performed by: FAMILY MEDICINE

## 2022-07-25 RX ORDER — PHENTERMINE HYDROCHLORIDE 37.5 MG/1
37.5 TABLET ORAL
Qty: 30 TABLET | Refills: 0 | Status: SHIPPED | OUTPATIENT
Start: 2022-07-28 | End: 2022-08-22 | Stop reason: SDUPTHER

## 2022-07-25 ASSESSMENT — ENCOUNTER SYMPTOMS
EYE PAIN: 0
CONSTIPATION: 0
SHORTNESS OF BREATH: 0
COUGH: 0
DIARRHEA: 0
ABDOMINAL PAIN: 0

## 2022-07-25 NOTE — PROGRESS NOTES
Chief Complaint   Patient presents with    Weight Management         ASSESSMENT/PLAN:  1. Class 3 severe obesity due to excess calories with serious comorbidity and body mass index (BMI) of 40.0 to 44.9 in adult [facility])  Complicating all aspects of patient care. PDMP reviewed- No suspicious activity  Follow up in 4 weeks to continue to monitor controlled med and gauge improvement in weight  Stable and doing well on regimen. Continue phentermine   Last refill date--> 2022  Will continue if she is down 4-5 lbs in first month. - phentermine (ADIPEX-P) 37.5 MG tablet; Take 1 tablet by mouth every morning (before breakfast) for 30 days. Dispense: 30 tablet; Refill: 0    2. Hypertension, unspecified type  Controlled on regimen   Labs UTD         HPI:  Gio Carvajal is a 49 y.o. (: 1975) here today for wt mgmt. Wt Readings from Last 3 Encounters:   22 220 lb 12.8 oz (100.2 kg)   22 239 lb 9.6 oz (108.7 kg)   22 235 lb 3.2 oz (106.7 kg)     BP Readings from Last 3 Encounters:   22 125/89   22 125/85   22 (!) 144/96     Started phentermine on 2022  No appetite the first week. She changed her diet a bit. More fruit, for lunch she is eating cucumber sandwiches, half chicken breast and fruit for dinner. Review of Systems   Constitutional:  Negative for appetite change, chills, fatigue and fever. HENT:  Negative for congestion. Eyes:  Negative for pain and visual disturbance. Respiratory:  Negative for cough and shortness of breath. Cardiovascular:  Negative for chest pain and palpitations. Gastrointestinal:  Negative for abdominal pain, constipation and diarrhea. Genitourinary:  Negative for difficulty urinating. Musculoskeletal:  Negative for arthralgias. Skin:  Negative for rash and wound. Neurological:  Negative for dizziness, weakness, light-headedness and headaches. Hematological:  Does not bruise/bleed easily. Psychiatric/Behavioral:  Negative for behavioral problems. No past medical history on file. Family History   Problem Relation Age of Onset    Hypothyroidism Mother     Asthma Mother     Diabetes Mother     COPD Mother     No Known Problems Father        Social History     Tobacco Use    Smoking status: Former     Years: 2.00     Types: Cigarettes     Quit date:      Years since quittin.5    Smokeless tobacco: Never   Substance Use Topics    Alcohol use: Not Currently    Drug use: Never       New Prescriptions    No medications on file       Meds Prior to visit:  Current Outpatient Medications on File Prior to Visit   Medication Sig Dispense Refill    hydroCHLOROthiazide (MICROZIDE) 12.5 MG capsule Take 1 capsule by mouth every morning 90 capsule 3    NONFORMULARY besylate      fluticasone-umeclidin-vilant (TRELEGY ELLIPTA) 100-62.5-25 MCG/INH AEPB Inhale 1 puff into the lungs daily 60 each 3    atorvastatin (LIPITOR) 20 MG tablet Take 1 tablet by mouth daily 90 tablet 3    albuterol sulfate HFA (VENTOLIN HFA) 108 (90 Base) MCG/ACT inhaler INHALE 2 PUFFS BY MOUTH EVERY 6 HOURS AS NEEDED FOR WHEEZING OR SHORTNESS OF BREATH 18 g 3    cetirizine (ZYRTEC) 10 MG tablet Take 1 tablet by mouth daily 90 tablet 1    montelukast (SINGULAIR) 10 MG tablet       triamterene-hydroCHLOROthiazide (MAXZIDE-25) 37.5-25 MG per tablet Take 1 tablet by mouth daily      BLISOVI FE  1-20 MG-MCG per tablet       Multiple Vitamin (MULTIVITAMIN) tablet Take 1 tablet by mouth daily       No current facility-administered medications on file prior to visit.      Allergies   Allergen Reactions    Erythromycin     Penicillins        OBJECTIVE:  /89   Pulse (!) 114   Temp (!) 95.1 °F (35.1 °C) (Temporal)   Wt 220 lb 12.8 oz (100.2 kg)   BMI 40.38 kg/m²   BP Readings from Last 2 Encounters:   22 125/89   22 125/85     Wt Readings from Last 3 Encounters:   22 220 lb 12.8 oz (100.2 kg)   22 239 lb 9.6 oz (108.7 kg)   06/14/22 235 lb 3.2 oz (106.7 kg)       Physical Exam  Vitals reviewed. Constitutional:       General: She is not in acute distress. Appearance: Normal appearance. She is obese. She is not ill-appearing. HENT:      Head: Normocephalic and atraumatic. Right Ear: External ear normal.      Left Ear: External ear normal.      Nose: Nose normal. No congestion. Mouth/Throat:      Mouth: Mucous membranes are moist.      Pharynx: Oropharynx is clear. No oropharyngeal exudate. Eyes:      Extraocular Movements: Extraocular movements intact. Conjunctiva/sclera: Conjunctivae normal.      Pupils: Pupils are equal, round, and reactive to light. Cardiovascular:      Rate and Rhythm: Normal rate and regular rhythm. Pulses: Normal pulses. Heart sounds: Normal heart sounds. Pulmonary:      Effort: Pulmonary effort is normal. No respiratory distress. Breath sounds: Normal breath sounds. No stridor. No wheezing, rhonchi or rales. Abdominal:      General: Bowel sounds are normal.      Palpations: Abdomen is soft. Tenderness: There is no abdominal tenderness. Musculoskeletal:         General: Normal range of motion. Cervical back: Normal range of motion and neck supple. Right lower leg: No edema. Left lower leg: No edema. Skin:     General: Skin is warm. Capillary Refill: Capillary refill takes less than 2 seconds. Findings: No erythema or rash. Neurological:      General: No focal deficit present. Mental Status: She is alert and oriented to person, place, and time. Mental status is at baseline. Motor: No weakness. Coordination: Coordination normal.      Gait: Gait normal.   Psychiatric:         Mood and Affect: Mood normal.         Behavior: Behavior normal.         Thought Content:  Thought content normal.         Judgment: Judgment normal.       Lab Results   Component Value Date    WBC 10.8 05/24/2022    HGB 13.6 05/24/2022    HCT 40.3 05/24/2022    MCV 83.0 05/24/2022     05/24/2022     Lab Results   Component Value Date     05/24/2022    K 3.8 05/24/2022     05/24/2022    CO2 22 05/24/2022    BUN 15 05/24/2022    CREATININE 0.7 05/24/2022    GLUCOSE 91 05/24/2022    CALCIUM 10.1 05/24/2022    PROT 7.7 05/24/2022    LABALBU 4.7 05/24/2022    BILITOT 0.4 05/24/2022    ALKPHOS 115 05/24/2022    AST 24 05/24/2022    ALT 21 05/24/2022    LABGLOM >60 05/24/2022    GFRAA >60 05/24/2022    AGRATIO 1.6 05/24/2022    GLOB 2.7 10/20/2021     Lab Results   Component Value Date    CHOL 190 05/24/2022    CHOL 241 (H) 10/20/2021    CHOL 240 (H) 03/31/2021     Lab Results   Component Value Date    TRIG 154 (H) 05/24/2022    TRIG 152 (H) 10/20/2021    TRIG 154 (H) 03/31/2021     Lab Results   Component Value Date    HDL 59 05/24/2022    HDL 50 10/20/2021    HDL 52 03/31/2021     Lab Results   Component Value Date    LDLCALC 100 (H) 05/24/2022    LDLCALC 161 (H) 10/20/2021    LDLCALC 157 (H) 03/31/2021     Lab Results   Component Value Date    LABVLDL 31 05/24/2022    LABVLDL 30 10/20/2021    LABVLDL 31 03/31/2021     Lab Results   Component Value Date    LABA1C 5.4 05/24/2022         Discussed use, benefit, and side effects of prescribed medications. Barriers to medication compliance addressed. All patient questions answered. Pt voiced understanding. RTC Return in about 4 weeks (around 8/22/2022). No future appointments.       Lennox Pian, MD  7/25/2022  9:16 AM

## 2022-08-22 ENCOUNTER — OFFICE VISIT (OUTPATIENT)
Dept: PRIMARY CARE CLINIC | Age: 47
End: 2022-08-22
Payer: COMMERCIAL

## 2022-08-22 VITALS
HEIGHT: 62 IN | DIASTOLIC BLOOD PRESSURE: 95 MMHG | BODY MASS INDEX: 39.12 KG/M2 | SYSTOLIC BLOOD PRESSURE: 143 MMHG | TEMPERATURE: 98.2 F | WEIGHT: 212.6 LBS | HEART RATE: 112 BPM | OXYGEN SATURATION: 98 %

## 2022-08-22 DIAGNOSIS — I10 HYPERTENSION, UNSPECIFIED TYPE: ICD-10-CM

## 2022-08-22 DIAGNOSIS — E66.09 CLASS 2 OBESITY DUE TO EXCESS CALORIES WITHOUT SERIOUS COMORBIDITY WITH BODY MASS INDEX (BMI) OF 38.0 TO 38.9 IN ADULT: Primary | ICD-10-CM

## 2022-08-22 PROBLEM — E66.812 CLASS 2 OBESITY DUE TO EXCESS CALORIES WITHOUT SERIOUS COMORBIDITY WITH BODY MASS INDEX (BMI) OF 38.0 TO 38.9 IN ADULT: Status: ACTIVE | Noted: 2022-05-15

## 2022-08-22 PROBLEM — E66.813 CLASS 3 SEVERE OBESITY DUE TO EXCESS CALORIES WITH SERIOUS COMORBIDITY AND BODY MASS INDEX (BMI) OF 40.0 TO 44.9 IN ADULT: Status: RESOLVED | Noted: 2022-05-15 | Resolved: 2022-08-22

## 2022-08-22 PROBLEM — E66.01 CLASS 3 SEVERE OBESITY DUE TO EXCESS CALORIES WITH SERIOUS COMORBIDITY AND BODY MASS INDEX (BMI) OF 40.0 TO 44.9 IN ADULT (HCC): Status: RESOLVED | Noted: 2022-05-15 | Resolved: 2022-08-22

## 2022-08-22 PROCEDURE — 99214 OFFICE O/P EST MOD 30 MIN: CPT | Performed by: FAMILY MEDICINE

## 2022-08-22 RX ORDER — PHENTERMINE HYDROCHLORIDE 37.5 MG/1
37.5 TABLET ORAL
Qty: 30 TABLET | Refills: 0 | Status: SHIPPED | OUTPATIENT
Start: 2022-08-23 | End: 2022-09-20 | Stop reason: SDUPTHER

## 2022-08-22 ASSESSMENT — ENCOUNTER SYMPTOMS
EYE PAIN: 0
DIARRHEA: 0
CONSTIPATION: 0
ABDOMINAL PAIN: 0
SHORTNESS OF BREATH: 0
COUGH: 0

## 2022-08-22 NOTE — PROGRESS NOTES
Chief Complaint   Patient presents with    Hypertension         ASSESSMENT/PLAN:  1. Class II obesity due to excess calories without serious comorbidity with body mass index of 53.8-51.7 in adult  Complicating all aspects of patient care. PDMP reviewed- No suspicious activity  Follow up in 4 weeks to continue to monitor controlled med and gauge improvement in weight  Stable and doing well on regimen. Continue phentermine   Last refill date--> 2022  Lost 8 lbs! - phentermine (ADIPEX-P) 37.5 MG tablet; Take 1 tablet by mouth every morning (before breakfast) for 30 days. Dispense: 30 tablet; Refill: 0    2. Hypertension, unspecified type  Mildly elevated in triage but walked here from work and is nervous about being late. Blood pressure is always normal  For the most part, controlled on regimen   Labs UTD  Continue Maxide with extra HCTZ  Follow-up in 4 weeks to gauge improvement with weight loss in hopes to decrease regimen         HPI:  Theodore London is a 49 y.o. (: 1975) here today for wt mgmt. Wt Readings from Last 3 Encounters:   22 212 lb 9.6 oz (96.4 kg)   22 220 lb 12.8 oz (100.2 kg)   22 239 lb 9.6 oz (108.7 kg)     BP Readings from Last 3 Encounters:   22 (!) 143/95   22 125/89   22 125/85     Started phentermine on 2022  No appetite the first week. She changed her diet a bit. More fruit, for lunch she is eating cucumber sandwiches, half chicken breast and fruit for dinner. Review of Systems   Constitutional:  Negative for appetite change, chills, fatigue and fever. HENT:  Negative for congestion. Eyes:  Negative for pain and visual disturbance. Respiratory:  Negative for cough and shortness of breath. Cardiovascular:  Negative for chest pain and palpitations. Gastrointestinal:  Negative for abdominal pain, constipation and diarrhea. Genitourinary:  Negative for difficulty urinating.    Musculoskeletal:  Negative for arthralgias. Skin:  Negative for rash and wound. Neurological:  Negative for dizziness, weakness, light-headedness and headaches. Hematological:  Does not bruise/bleed easily. Psychiatric/Behavioral:  Negative for behavioral problems. History reviewed. No pertinent past medical history. Family History   Problem Relation Age of Onset    Hypothyroidism Mother     Asthma Mother     Diabetes Mother     COPD Mother     No Known Problems Father        Social History     Tobacco Use    Smoking status: Former     Years: 2.00     Types: Cigarettes     Quit date:      Years since quittin.6    Smokeless tobacco: Never   Substance Use Topics    Alcohol use: Not Currently    Drug use: Never       New Prescriptions    No medications on file       Meds Prior to visit:  Current Outpatient Medications on File Prior to Visit   Medication Sig Dispense Refill    hydroCHLOROthiazide (MICROZIDE) 12.5 MG capsule Take 1 capsule by mouth every morning 90 capsule 3    fluticasone-umeclidin-vilant (TRELEGY ELLIPTA) 100-62.5-25 MCG/INH AEPB Inhale 1 puff into the lungs daily 60 each 3    atorvastatin (LIPITOR) 20 MG tablet Take 1 tablet by mouth daily 90 tablet 3    albuterol sulfate HFA (VENTOLIN HFA) 108 (90 Base) MCG/ACT inhaler INHALE 2 PUFFS BY MOUTH EVERY 6 HOURS AS NEEDED FOR WHEEZING OR SHORTNESS OF BREATH 18 g 3    cetirizine (ZYRTEC) 10 MG tablet Take 1 tablet by mouth daily 90 tablet 1    montelukast (SINGULAIR) 10 MG tablet       triamterene-hydroCHLOROthiazide (MAXZIDE-25) 37.5-25 MG per tablet Take 1 tablet by mouth daily      BLISOVI FE 1/20 1-20 MG-MCG per tablet       Multiple Vitamin (MULTIVITAMIN) tablet Take 1 tablet by mouth daily       No current facility-administered medications on file prior to visit.      Allergies   Allergen Reactions    Erythromycin        OBJECTIVE:  BP (!) 143/95   Pulse (!) 112   Temp 98.2 °F (36.8 °C)   Ht 5' 2\" (1.575 m)   Wt 212 lb 9.6 oz (96.4 kg)   SpO2 98%   BMI 38.89 kg/m²   BP Readings from Last 2 Encounters:   08/22/22 (!) 143/95   07/25/22 125/89     Wt Readings from Last 3 Encounters:   08/22/22 212 lb 9.6 oz (96.4 kg)   07/25/22 220 lb 12.8 oz (100.2 kg)   06/29/22 239 lb 9.6 oz (108.7 kg)       Physical Exam  Vitals reviewed. Constitutional:       General: She is not in acute distress. Appearance: Normal appearance. She is obese. She is not ill-appearing. HENT:      Head: Normocephalic and atraumatic. Right Ear: External ear normal.      Left Ear: External ear normal.      Nose: Nose normal. No congestion. Mouth/Throat:      Mouth: Mucous membranes are moist.      Pharynx: Oropharynx is clear. No oropharyngeal exudate. Eyes:      Extraocular Movements: Extraocular movements intact. Conjunctiva/sclera: Conjunctivae normal.      Pupils: Pupils are equal, round, and reactive to light. Cardiovascular:      Rate and Rhythm: Normal rate and regular rhythm. Pulses: Normal pulses. Heart sounds: Normal heart sounds. Pulmonary:      Effort: Pulmonary effort is normal. No respiratory distress. Breath sounds: Normal breath sounds. No stridor. No wheezing, rhonchi or rales. Abdominal:      General: Bowel sounds are normal.      Palpations: Abdomen is soft. Tenderness: There is no abdominal tenderness. Musculoskeletal:         General: Normal range of motion. Cervical back: Normal range of motion and neck supple. Right lower leg: No edema. Left lower leg: No edema. Skin:     General: Skin is warm. Capillary Refill: Capillary refill takes less than 2 seconds. Findings: No erythema or rash. Neurological:      General: No focal deficit present. Mental Status: She is alert and oriented to person, place, and time. Mental status is at baseline. Motor: No weakness.       Coordination: Coordination normal.      Gait: Gait normal.   Psychiatric:         Mood and Affect: Mood normal.         Behavior: Behavior normal.         Thought Content: Thought content normal.         Judgment: Judgment normal.       Lab Results   Component Value Date    WBC 10.8 05/24/2022    HGB 13.6 05/24/2022    HCT 40.3 05/24/2022    MCV 83.0 05/24/2022     05/24/2022     Lab Results   Component Value Date     05/24/2022    K 3.8 05/24/2022     05/24/2022    CO2 22 05/24/2022    BUN 15 05/24/2022    CREATININE 0.7 05/24/2022    GLUCOSE 91 05/24/2022    CALCIUM 10.1 05/24/2022    PROT 7.7 05/24/2022    LABALBU 4.7 05/24/2022    BILITOT 0.4 05/24/2022    ALKPHOS 115 05/24/2022    AST 24 05/24/2022    ALT 21 05/24/2022    LABGLOM >60 05/24/2022    GFRAA >60 05/24/2022    AGRATIO 1.6 05/24/2022    GLOB 2.7 10/20/2021     Lab Results   Component Value Date    CHOL 190 05/24/2022    CHOL 241 (H) 10/20/2021    CHOL 240 (H) 03/31/2021     Lab Results   Component Value Date    TRIG 154 (H) 05/24/2022    TRIG 152 (H) 10/20/2021    TRIG 154 (H) 03/31/2021     Lab Results   Component Value Date    HDL 59 05/24/2022    HDL 50 10/20/2021    HDL 52 03/31/2021     Lab Results   Component Value Date    LDLCALC 100 (H) 05/24/2022    LDLCALC 161 (H) 10/20/2021    LDLCALC 157 (H) 03/31/2021     Lab Results   Component Value Date    LABVLDL 31 05/24/2022    LABVLDL 30 10/20/2021    LABVLDL 31 03/31/2021     Lab Results   Component Value Date    LABA1C 5.4 05/24/2022         Discussed use, benefit, and side effects of prescribed medications. Barriers to medication compliance addressed. All patient questions answered. Pt voiced understanding. RTC Return in about 4 weeks (around 9/19/2022).     Future Appointments   Date Time Provider Tameka Dumont   9/20/2022  8:15 AM MD Sanya Bullock MD  8/22/2022  8:41 AM

## 2022-08-30 LAB — PAP SMEAR, EXTERNAL: NORMAL

## 2022-09-20 ENCOUNTER — OFFICE VISIT (OUTPATIENT)
Dept: PRIMARY CARE CLINIC | Age: 47
End: 2022-09-20
Payer: COMMERCIAL

## 2022-09-20 VITALS
HEART RATE: 92 BPM | WEIGHT: 203.4 LBS | SYSTOLIC BLOOD PRESSURE: 119 MMHG | DIASTOLIC BLOOD PRESSURE: 75 MMHG | BODY MASS INDEX: 37.2 KG/M2

## 2022-09-20 DIAGNOSIS — J45.40 MODERATE PERSISTENT ASTHMA WITHOUT COMPLICATION: ICD-10-CM

## 2022-09-20 DIAGNOSIS — I10 HYPERTENSION, UNSPECIFIED TYPE: ICD-10-CM

## 2022-09-20 DIAGNOSIS — E66.01 CLASS 2 SEVERE OBESITY DUE TO EXCESS CALORIES WITH SERIOUS COMORBIDITY AND BODY MASS INDEX (BMI) OF 37.0 TO 37.9 IN ADULT (HCC): Primary | ICD-10-CM

## 2022-09-20 PROCEDURE — 99214 OFFICE O/P EST MOD 30 MIN: CPT | Performed by: FAMILY MEDICINE

## 2022-09-20 RX ORDER — PHENTERMINE HYDROCHLORIDE 37.5 MG/1
37.5 TABLET ORAL
Qty: 30 TABLET | Refills: 0 | Status: SHIPPED | OUTPATIENT
Start: 2022-09-20 | End: 2022-10-20

## 2022-09-20 ASSESSMENT — ENCOUNTER SYMPTOMS
SHORTNESS OF BREATH: 0
CONSTIPATION: 0
EYE PAIN: 0
DIARRHEA: 0
COUGH: 0
ABDOMINAL PAIN: 0

## 2022-09-20 NOTE — PROGRESS NOTES
Chief Complaint   Patient presents with    Weight Management    Hypertension         ASSESSMENT/PLAN:  1. Class II obesity due to excess calories without serious comorbidity with body mass index of 50.9-18.3 in adult  Complicating all aspects of patient care. PDMP reviewed- No suspicious activity  Follow up in 4 weeks to continue to monitor and gauge improvement in weight  Stable and doing well on regimen. Continue phentermine   Last refill date--> 2022  - phentermine (ADIPEX-P) 37.5 MG tablet; Take 1 tablet by mouth every morning (before breakfast) for 30 days. Dispense: 30 tablet; Refill: 0    2. Hypertension, unspecified type  Mildly elevated in triage but walked here from work and is nervous about being late. Blood pressure is always normal  For the most part, controlled on regimen   Labs UTD  Continue Maxide with extra HCTZ  Follow-up in 4 weeks to gauge improvement with weight loss in hopes to decrease regimen    3. Moderate persistent asthma   Needs trelegy refill through different pharmacy due to cost      HPI:  Sukhjinder Dias is a 49 y.o. (: 1975) here today for wt mgmt. Wt Readings from Last 3 Encounters:   22 203 lb 6.4 oz (92.3 kg)   22 212 lb 9.6 oz (96.4 kg)   22 220 lb 12.8 oz (100.2 kg)     BP Readings from Last 3 Encounters:   22 119/75   22 (!) 143/95   22 125/89     Started phentermine on 2022  Fruit, for lunch she is eating cucumber sandwiches, half chicken breast and fruit for dinner. Review of Systems   Constitutional:  Negative for appetite change, chills, fatigue and fever. HENT:  Negative for congestion. Eyes:  Negative for pain and visual disturbance. Respiratory:  Negative for cough and shortness of breath. Cardiovascular:  Negative for chest pain and palpitations. Gastrointestinal:  Negative for abdominal pain, constipation and diarrhea. Genitourinary:  Negative for difficulty urinating.    Musculoskeletal: 212 lb 9.6 oz (96.4 kg)   07/25/22 220 lb 12.8 oz (100.2 kg)       Physical Exam  Vitals reviewed. Constitutional:       General: She is not in acute distress. Appearance: Normal appearance. She is obese. She is not ill-appearing. HENT:      Head: Normocephalic and atraumatic. Right Ear: External ear normal.      Left Ear: External ear normal.      Nose: Nose normal. No congestion. Mouth/Throat:      Mouth: Mucous membranes are moist.      Pharynx: Oropharynx is clear. No oropharyngeal exudate. Eyes:      Extraocular Movements: Extraocular movements intact. Conjunctiva/sclera: Conjunctivae normal.      Pupils: Pupils are equal, round, and reactive to light. Cardiovascular:      Rate and Rhythm: Normal rate and regular rhythm. Pulses: Normal pulses. Heart sounds: Normal heart sounds. Pulmonary:      Effort: Pulmonary effort is normal. No respiratory distress. Breath sounds: Normal breath sounds. No stridor. No wheezing, rhonchi or rales. Abdominal:      General: Bowel sounds are normal.      Palpations: Abdomen is soft. Tenderness: There is no abdominal tenderness. Musculoskeletal:         General: Normal range of motion. Cervical back: Normal range of motion and neck supple. Right lower leg: No edema. Left lower leg: No edema. Skin:     General: Skin is warm. Capillary Refill: Capillary refill takes less than 2 seconds. Findings: No erythema or rash. Neurological:      General: No focal deficit present. Mental Status: She is alert and oriented to person, place, and time. Mental status is at baseline. Motor: No weakness. Coordination: Coordination normal.      Gait: Gait normal.   Psychiatric:         Mood and Affect: Mood normal.         Behavior: Behavior normal.         Thought Content:  Thought content normal.         Judgment: Judgment normal.       Lab Results   Component Value Date    WBC 10.8 05/24/2022    HGB 13.6 05/24/2022    HCT 40.3 05/24/2022    MCV 83.0 05/24/2022     05/24/2022     Lab Results   Component Value Date     05/24/2022    K 3.8 05/24/2022     05/24/2022    CO2 22 05/24/2022    BUN 15 05/24/2022    CREATININE 0.7 05/24/2022    GLUCOSE 91 05/24/2022    CALCIUM 10.1 05/24/2022    PROT 7.7 05/24/2022    LABALBU 4.7 05/24/2022    BILITOT 0.4 05/24/2022    ALKPHOS 115 05/24/2022    AST 24 05/24/2022    ALT 21 05/24/2022    LABGLOM >60 05/24/2022    GFRAA >60 05/24/2022    AGRATIO 1.6 05/24/2022    GLOB 2.7 10/20/2021     Lab Results   Component Value Date    CHOL 190 05/24/2022    CHOL 241 (H) 10/20/2021    CHOL 240 (H) 03/31/2021     Lab Results   Component Value Date    TRIG 154 (H) 05/24/2022    TRIG 152 (H) 10/20/2021    TRIG 154 (H) 03/31/2021     Lab Results   Component Value Date    HDL 59 05/24/2022    HDL 50 10/20/2021    HDL 52 03/31/2021     Lab Results   Component Value Date    LDLCALC 100 (H) 05/24/2022    LDLCALC 161 (H) 10/20/2021    LDLCALC 157 (H) 03/31/2021     Lab Results   Component Value Date    LABVLDL 31 05/24/2022    LABVLDL 30 10/20/2021    LABVLDL 31 03/31/2021     Lab Results   Component Value Date    LABA1C 5.4 05/24/2022         Discussed use, benefit, and side effects of prescribed medications. Barriers to medication compliance addressed. All patient questions answered. Pt voiced understanding. RTC No follow-ups on file. No future appointments.         Jesus Ayoub MD  9/20/2022  8:34 AM

## 2022-10-20 DIAGNOSIS — J30.2 SEASONAL ALLERGIES: ICD-10-CM

## 2022-10-20 RX ORDER — CETIRIZINE HYDROCHLORIDE 10 MG/1
TABLET ORAL
Qty: 90 TABLET | Refills: 3 | Status: SHIPPED | OUTPATIENT
Start: 2022-10-20

## 2022-10-20 NOTE — TELEPHONE ENCOUNTER
Medication:   Requested Prescriptions     Pending Prescriptions Disp Refills    cetirizine (ZYRTEC) 10 MG tablet [Pharmacy Med Name: CETIRIZINE TABS-OTC 10MG] 90 tablet 3     Sig: TAKE 1 TABLET DAILY        Last Filled:  05/11/22    Patient Phone Number: [phone] (home)     Last appt: 9/20/2022   Return in about 4 weeks (around 10/18/2022). Next appt: Visit date not found    Last OARRS: No flowsheet data found.

## 2022-12-02 ENCOUNTER — PATIENT MESSAGE (OUTPATIENT)
Dept: PRIMARY CARE CLINIC | Age: 47
End: 2022-12-02

## 2022-12-02 DIAGNOSIS — I10 HYPERTENSION, UNSPECIFIED TYPE: ICD-10-CM

## 2022-12-02 DIAGNOSIS — I10 ESSENTIAL HYPERTENSION: ICD-10-CM

## 2022-12-02 RX ORDER — TRIAMTERENE AND HYDROCHLOROTHIAZIDE 37.5; 25 MG/1; MG/1
1 TABLET ORAL DAILY
Qty: 90 TABLET | Refills: 3 | Status: SHIPPED | OUTPATIENT
Start: 2022-12-02 | End: 2022-12-15

## 2022-12-02 NOTE — TELEPHONE ENCOUNTER
From: Kenny Felix  To: Dr. Shayan Parikh: 12/2/2022 7:51 AM EST  Subject: Refill problem     Good morning i am trying to get [facility] to refill my Triamterene 37.5 and they say the office has to contact them not sure if you can help out with this or if i need to come in for a vist

## 2022-12-15 RX ORDER — HYDROCHLOROTHIAZIDE 12.5 MG/1
12.5 CAPSULE, GELATIN COATED ORAL EVERY MORNING
Qty: 90 CAPSULE | Refills: 3 | Status: SHIPPED | OUTPATIENT
Start: 2022-12-15 | End: 2023-12-10

## 2022-12-15 RX ORDER — TRIAMTERENE AND HYDROCHLOROTHIAZIDE 37.5; 25 MG/1; MG/1
1 TABLET ORAL DAILY
Qty: 90 TABLET | Refills: 3 | Status: SHIPPED | OUTPATIENT
Start: 2022-12-15

## 2023-01-16 ASSESSMENT — ENCOUNTER SYMPTOMS
SHORTNESS OF BREATH: 0
EYE PAIN: 0
COUGH: 0
ABDOMINAL PAIN: 0
CONSTIPATION: 0
DIARRHEA: 0

## 2023-01-17 ENCOUNTER — OFFICE VISIT (OUTPATIENT)
Dept: PRIMARY CARE CLINIC | Age: 48
End: 2023-01-17
Payer: COMMERCIAL

## 2023-01-17 VITALS
DIASTOLIC BLOOD PRESSURE: 91 MMHG | HEIGHT: 62 IN | TEMPERATURE: 97.4 F | SYSTOLIC BLOOD PRESSURE: 133 MMHG | BODY MASS INDEX: 37.58 KG/M2 | HEART RATE: 95 BPM | WEIGHT: 204.2 LBS

## 2023-01-17 DIAGNOSIS — L65.9 THINNING HAIR: ICD-10-CM

## 2023-01-17 DIAGNOSIS — I10 HYPERTENSION, UNSPECIFIED TYPE: ICD-10-CM

## 2023-01-17 DIAGNOSIS — E66.01 CLASS 2 SEVERE OBESITY DUE TO EXCESS CALORIES WITH SERIOUS COMORBIDITY AND BODY MASS INDEX (BMI) OF 37.0 TO 37.9 IN ADULT (HCC): Primary | ICD-10-CM

## 2023-01-17 LAB
A/G RATIO: 1.6 (ref 1.1–2.2)
ALBUMIN SERPL-MCNC: 4.3 G/DL (ref 3.4–5)
ALP BLD-CCNC: 116 U/L (ref 40–129)
ALT SERPL-CCNC: 14 U/L (ref 10–40)
ANION GAP SERPL CALCULATED.3IONS-SCNC: 16 MMOL/L (ref 3–16)
AST SERPL-CCNC: 17 U/L (ref 15–37)
BASOPHILS ABSOLUTE: 0.1 K/UL (ref 0–0.2)
BASOPHILS RELATIVE PERCENT: 1.3 %
BILIRUB SERPL-MCNC: 0.3 MG/DL (ref 0–1)
BUN BLDV-MCNC: 11 MG/DL (ref 7–20)
CALCIUM SERPL-MCNC: 9.2 MG/DL (ref 8.3–10.6)
CHLORIDE BLD-SCNC: 99 MMOL/L (ref 99–110)
CO2: 25 MMOL/L (ref 21–32)
CREAT SERPL-MCNC: 0.6 MG/DL (ref 0.6–1.1)
EOSINOPHILS ABSOLUTE: 0.2 K/UL (ref 0–0.6)
EOSINOPHILS RELATIVE PERCENT: 2 %
ESTRADIOL LEVEL: <5 PG/ML
FERRITIN: 38.7 NG/ML (ref 15–150)
GFR SERPL CREATININE-BSD FRML MDRD: >60 ML/MIN/{1.73_M2}
GLUCOSE BLD-MCNC: 87 MG/DL (ref 70–99)
HCT VFR BLD CALC: 41.2 % (ref 36–48)
HEMOGLOBIN: 13.3 G/DL (ref 12–16)
IRON SATURATION: 17 % (ref 15–50)
IRON: 72 UG/DL (ref 37–145)
LYMPHOCYTES ABSOLUTE: 2.6 K/UL (ref 1–5.1)
LYMPHOCYTES RELATIVE PERCENT: 28.7 %
MCH RBC QN AUTO: 27.4 PG (ref 26–34)
MCHC RBC AUTO-ENTMCNC: 32.4 G/DL (ref 31–36)
MCV RBC AUTO: 84.7 FL (ref 80–100)
MONOCYTES ABSOLUTE: 0.6 K/UL (ref 0–1.3)
MONOCYTES RELATIVE PERCENT: 6.3 %
NEUTROPHILS ABSOLUTE: 5.5 K/UL (ref 1.7–7.7)
NEUTROPHILS RELATIVE PERCENT: 61.7 %
PDW BLD-RTO: 13.2 % (ref 12.4–15.4)
PLATELET # BLD: 292 K/UL (ref 135–450)
PMV BLD AUTO: 9.5 FL (ref 5–10.5)
POTASSIUM SERPL-SCNC: 3.9 MMOL/L (ref 3.5–5.1)
RBC # BLD: 4.86 M/UL (ref 4–5.2)
SODIUM BLD-SCNC: 140 MMOL/L (ref 136–145)
TOTAL IRON BINDING CAPACITY: 421 UG/DL (ref 260–445)
TOTAL PROTEIN: 7 G/DL (ref 6.4–8.2)
TRANSFERRIN: 348 MG/DL (ref 200–360)
TSH REFLEX: 3.54 UIU/ML (ref 0.27–4.2)
VITAMIN D 25-HYDROXY: 35.1 NG/ML
WBC # BLD: 9 K/UL (ref 4–11)

## 2023-01-17 PROCEDURE — 3075F SYST BP GE 130 - 139MM HG: CPT | Performed by: FAMILY MEDICINE

## 2023-01-17 PROCEDURE — 3080F DIAST BP >= 90 MM HG: CPT | Performed by: FAMILY MEDICINE

## 2023-01-17 PROCEDURE — 99214 OFFICE O/P EST MOD 30 MIN: CPT | Performed by: FAMILY MEDICINE

## 2023-01-17 RX ORDER — PHENTERMINE HYDROCHLORIDE 37.5 MG/1
37.5 TABLET ORAL
Qty: 30 TABLET | Refills: 0 | Status: SHIPPED | OUTPATIENT
Start: 2023-01-17 | End: 2023-02-16

## 2023-01-17 ASSESSMENT — PATIENT HEALTH QUESTIONNAIRE - PHQ9
SUM OF ALL RESPONSES TO PHQ QUESTIONS 1-9: 0
2. FEELING DOWN, DEPRESSED OR HOPELESS: 0
SUM OF ALL RESPONSES TO PHQ9 QUESTIONS 1 & 2: 0
SUM OF ALL RESPONSES TO PHQ QUESTIONS 1-9: 0
1. LITTLE INTEREST OR PLEASURE IN DOING THINGS: 0

## 2023-01-17 NOTE — PATIENT INSTRUCTIONS
Rosemary oil on scalp     Collagen types I & III - CMP or GMP    [employer]:   Vital Vitamins Multi Collagen Complex  Vital Protein Powder

## 2023-01-17 NOTE — PROGRESS NOTES
Chief Complaint   Patient presents with    Weight Loss         ASSESSMENT/PLAN:  1. Class II obesity due to excess calories without serious comorbidity with body mass index of 32.2-83.5 in adult  Complicating all aspects of patient care. Has kept weight stable with [employer] and strict dietary habits  PDMP reviewed- No suspicious activity  Follow up in 4 weeks to continue to monitor and gauge improvement in weight  Last refill is April 11, 2023    2. Hypertension, unspecified type  Mildly elevated in triage but walked here from work and is nervous about being late. Blood pressure is always normal at home   For the most part, controlled on regimen   Continue Maxide with extra HCTZ  Follow-up in 4 weeks to gauge improvement with weight loss in hopes to decrease regimen    3. Hair thinning  Discussed remedies like collagen and rosemary oil  Will rule out thyroid function abnormality vs anemia vs iron def vs vit D def.     Orders Placed This Encounter    TSH with Reflex     Standing Status:   Future     Number of Occurrences:   1     Standing Expiration Date:   1/17/2024    Estradiol     Standing Status:   Future     Number of Occurrences:   1     Standing Expiration Date:   1/17/2024    CBC with Auto Differential     Standing Status:   Future     Number of Occurrences:   1     Standing Expiration Date:   1/17/2024    Ferritin     Standing Status:   Future     Number of Occurrences:   1     Standing Expiration Date:   1/17/2024    Iron and TIBC     Standing Status:   Future     Number of Occurrences:   1     Standing Expiration Date:   1/17/2024    Transferrin     Standing Status:   Future     Number of Occurrences:   1     Standing Expiration Date:   1/17/2024    Vitamin D 25 Hydroxy     Standing Status:   Future     Number of Occurrences:   1     Standing Expiration Date:   1/17/2024    Comprehensive Metabolic Panel     Standing Status:   Future     Number of Occurrences:   1     Standing Expiration Date: 2023    phentermine (ADIPEX-P) 37.5 MG tablet     Sig: Take 1 tablet by mouth every morning (before breakfast) for 30 days. Max Daily Amount: 37.5 mg     Dispense:  30 tablet     Refill:  0         HPI:  Sherri Fleming is a 49 y.o. (: 1975) here today for wt mgmt. Wt Readings from Last 3 Encounters:   23 204 lb 3.2 oz (92.6 kg)   22 203 lb 6.4 oz (92.3 kg)   22 212 lb 9.6 oz (96.4 kg)     BP Readings from Last 3 Encounters:   23 (!) 133/91   22 119/75   22 (!) 143/95     Started phentermine on 2022 and completed 3 month course. Fruit, for lunch she is eating cucumber sandwiches, half chicken breast and fruit for dinner. Working out daily with [employer] and is walking a lot. She remembers her mom and grandma losing their hair and wearing wigs. She is worried that her hairs is starting to thin and fall out. She would like to discuss options. Review of Systems   Constitutional:  Negative for appetite change, chills, fatigue and fever. HENT:  Negative for congestion. Eyes:  Negative for pain and visual disturbance. Respiratory:  Negative for cough and shortness of breath. Cardiovascular:  Negative for chest pain and palpitations. Gastrointestinal:  Negative for abdominal pain, constipation and diarrhea. Genitourinary:  Negative for difficulty urinating. Musculoskeletal:  Negative for arthralgias. Skin:  Negative for rash and wound. Neurological:  Negative for dizziness, weakness, light-headedness and headaches. Hematological:  Does not bruise/bleed easily. Psychiatric/Behavioral:  Negative for behavioral problems. No past medical history on file.     Family History   Problem Relation Age of Onset    Hypothyroidism Mother     Asthma Mother     Diabetes Mother     COPD Mother     No Known Problems Father        Social History     Tobacco Use    Smoking status: Former     Years: 2.00     Types: Cigarettes     Quit date: 1997     Years since quittin.0    Smokeless tobacco: Never   Substance Use Topics    Alcohol use: Not Currently    Drug use: Never       New Prescriptions    PHENTERMINE (ADIPEX-P) 37.5 MG TABLET    Take 1 tablet by mouth every morning (before breakfast) for 30 days. Max Daily Amount: 37.5 mg       Meds Prior to visit:  Current Outpatient Medications on File Prior to Visit   Medication Sig Dispense Refill    triamterene-hydroCHLOROthiazide (MAXZIDE-25) 37.5-25 MG per tablet Take 1 tablet by mouth daily 90 tablet 3    hydroCHLOROthiazide (MICROZIDE) 12.5 MG capsule Take 1 capsule by mouth every morning 90 capsule 3    cetirizine (ZYRTEC) 10 MG tablet TAKE 1 TABLET DAILY 90 tablet 3    fluticasone-umeclidin-vilant (TRELEGY ELLIPTA) 100-62.5-25 MCG/INH AEPB Inhale 1 puff into the lungs daily 60 each 3    atorvastatin (LIPITOR) 20 MG tablet Take 1 tablet by mouth daily 90 tablet 3    albuterol sulfate HFA (VENTOLIN HFA) 108 (90 Base) MCG/ACT inhaler INHALE 2 PUFFS BY MOUTH EVERY 6 HOURS AS NEEDED FOR WHEEZING OR SHORTNESS OF BREATH 18 g 3    montelukast (SINGULAIR) 10 MG tablet       BLISOVI FE  1-20 MG-MCG per tablet       Multiple Vitamin (MULTIVITAMIN) tablet Take 1 tablet by mouth daily       No current facility-administered medications on file prior to visit. No Known Allergies      OBJECTIVE:  BP (!) 133/91   Pulse 95   Temp 97.4 °F (36.3 °C) (Temporal)   Ht 5' 2\" (1.575 m)   Wt 204 lb 3.2 oz (92.6 kg)   BMI 37.35 kg/m²   BP Readings from Last 2 Encounters:   23 (!) 133/91   22 119/75     Wt Readings from Last 3 Encounters:   23 204 lb 3.2 oz (92.6 kg)   22 203 lb 6.4 oz (92.3 kg)   22 212 lb 9.6 oz (96.4 kg)       Physical Exam  Vitals reviewed. Constitutional:       General: She is not in acute distress. Appearance: Normal appearance. She is obese. She is not ill-appearing. HENT:      Head: Normocephalic and atraumatic.       Right Ear: External ear normal.      Left Ear: External ear normal.      Nose: Nose normal. No congestion. Mouth/Throat:      Mouth: Mucous membranes are moist.      Pharynx: Oropharynx is clear. No oropharyngeal exudate. Eyes:      Extraocular Movements: Extraocular movements intact. Conjunctiva/sclera: Conjunctivae normal.      Pupils: Pupils are equal, round, and reactive to light. Cardiovascular:      Rate and Rhythm: Normal rate and regular rhythm. Pulses: Normal pulses. Heart sounds: Normal heart sounds. Pulmonary:      Effort: Pulmonary effort is normal. No respiratory distress. Breath sounds: Normal breath sounds. No stridor. No wheezing, rhonchi or rales. Abdominal:      General: Bowel sounds are normal.      Palpations: Abdomen is soft. Tenderness: There is no abdominal tenderness. Musculoskeletal:         General: Normal range of motion. Cervical back: Normal range of motion and neck supple. Right lower leg: No edema. Left lower leg: No edema. Skin:     General: Skin is warm. Capillary Refill: Capillary refill takes less than 2 seconds. Findings: No erythema or rash. Neurological:      General: No focal deficit present. Mental Status: She is alert and oriented to person, place, and time. Mental status is at baseline. Motor: No weakness. Coordination: Coordination normal.      Gait: Gait normal.   Psychiatric:         Mood and Affect: Mood normal.         Behavior: Behavior normal.         Thought Content:  Thought content normal.         Judgment: Judgment normal.       Lab Results   Component Value Date    WBC 10.8 05/24/2022    HGB 13.6 05/24/2022    HCT 40.3 05/24/2022    MCV 83.0 05/24/2022     05/24/2022     Lab Results   Component Value Date     05/24/2022    K 3.8 05/24/2022     05/24/2022    CO2 22 05/24/2022    BUN 15 05/24/2022    CREATININE 0.7 05/24/2022    GLUCOSE 91 05/24/2022    CALCIUM 10.1 05/24/2022    PROT 7.7 05/24/2022    LABALBU 4.7 05/24/2022    BILITOT 0.4 05/24/2022    ALKPHOS 115 05/24/2022    AST 24 05/24/2022    ALT 21 05/24/2022    LABGLOM >60 05/24/2022    GFRAA >60 05/24/2022    AGRATIO 1.6 05/24/2022    GLOB 2.7 10/20/2021     Lab Results   Component Value Date    CHOL 190 05/24/2022    CHOL 241 (H) 10/20/2021    CHOL 240 (H) 03/31/2021     Lab Results   Component Value Date    TRIG 154 (H) 05/24/2022    TRIG 152 (H) 10/20/2021    TRIG 154 (H) 03/31/2021     Lab Results   Component Value Date    HDL 59 05/24/2022    HDL 50 10/20/2021    HDL 52 03/31/2021     Lab Results   Component Value Date    LDLCALC 100 (H) 05/24/2022    LDLCALC 161 (H) 10/20/2021    LDLCALC 157 (H) 03/31/2021     Lab Results   Component Value Date    LABVLDL 31 05/24/2022    LABVLDL 30 10/20/2021    LABVLDL 31 03/31/2021     Lab Results   Component Value Date    LABA1C 5.4 05/24/2022         Discussed use, benefit, and side effects of prescribed medications. Barriers to medication compliance addressed. All patient questions answered. Pt voiced understanding. RTC No follow-ups on file.     Future Appointments   Date Time Provider Tameka Dumont   1/17/2023  8:00 AM MD Tiffani Wright MD  1/17/2023  7:36 AM

## 2023-02-13 SDOH — ECONOMIC STABILITY: FOOD INSECURITY: WITHIN THE PAST 12 MONTHS, YOU WORRIED THAT YOUR FOOD WOULD RUN OUT BEFORE YOU GOT MONEY TO BUY MORE.: NEVER TRUE

## 2023-02-13 SDOH — ECONOMIC STABILITY: INCOME INSECURITY: HOW HARD IS IT FOR YOU TO PAY FOR THE VERY BASICS LIKE FOOD, HOUSING, MEDICAL CARE, AND HEATING?: SOMEWHAT HARD

## 2023-02-13 SDOH — ECONOMIC STABILITY: FOOD INSECURITY: WITHIN THE PAST 12 MONTHS, THE FOOD YOU BOUGHT JUST DIDN'T LAST AND YOU DIDN'T HAVE MONEY TO GET MORE.: NEVER TRUE

## 2023-02-13 SDOH — ECONOMIC STABILITY: HOUSING INSECURITY
IN THE LAST 12 MONTHS, WAS THERE A TIME WHEN YOU DID NOT HAVE A STEADY PLACE TO SLEEP OR SLEPT IN A SHELTER (INCLUDING NOW)?: NO

## 2023-02-13 ASSESSMENT — ENCOUNTER SYMPTOMS
CONSTIPATION: 0
EYE PAIN: 0
DIARRHEA: 0
COUGH: 0
ABDOMINAL PAIN: 0
SHORTNESS OF BREATH: 0

## 2023-02-14 ENCOUNTER — OFFICE VISIT (OUTPATIENT)
Dept: PRIMARY CARE CLINIC | Age: 48
End: 2023-02-14
Payer: COMMERCIAL

## 2023-02-14 VITALS
DIASTOLIC BLOOD PRESSURE: 83 MMHG | BODY MASS INDEX: 36.33 KG/M2 | HEIGHT: 62 IN | WEIGHT: 197.4 LBS | TEMPERATURE: 97 F | SYSTOLIC BLOOD PRESSURE: 130 MMHG | HEART RATE: 88 BPM

## 2023-02-14 DIAGNOSIS — E66.01 CLASS 2 SEVERE OBESITY DUE TO EXCESS CALORIES WITH SERIOUS COMORBIDITY AND BODY MASS INDEX (BMI) OF 37.0 TO 37.9 IN ADULT (HCC): Primary | ICD-10-CM

## 2023-02-14 DIAGNOSIS — I10 HYPERTENSION, UNSPECIFIED TYPE: ICD-10-CM

## 2023-02-14 PROCEDURE — 3075F SYST BP GE 130 - 139MM HG: CPT | Performed by: FAMILY MEDICINE

## 2023-02-14 PROCEDURE — 99214 OFFICE O/P EST MOD 30 MIN: CPT | Performed by: FAMILY MEDICINE

## 2023-02-14 PROCEDURE — 3079F DIAST BP 80-89 MM HG: CPT | Performed by: FAMILY MEDICINE

## 2023-02-14 RX ORDER — PHENTERMINE HYDROCHLORIDE 37.5 MG/1
37.5 TABLET ORAL
Qty: 30 TABLET | Refills: 0 | Status: SHIPPED | OUTPATIENT
Start: 2023-02-14 | End: 2023-03-16

## 2023-02-14 NOTE — PROGRESS NOTES
Chief Complaint   Patient presents with    Weight Management         ASSESSMENT/PLAN:  1. Class II obesity due to excess calories without serious comorbidity with body mass index of 37.5-71.2 in adult  Complicating all aspects of patient care. Has kept weight stable with [employer] and strict dietary habits  PDMP reviewed- No suspicious activity  Follow up in 4 weeks to continue to monitor and gauge improvement in weight  Last refill is 2023    2. Hypertension, unspecified type  Controlled on regimen   Continue Maxide with extra HCTZ  Follow-up in 4 weeks to gauge improvement with weight loss in hopes to decrease regimen      Orders Placed This Encounter    phentermine (ADIPEX-P) 37.5 MG tablet     Sig: Take 1 tablet by mouth every morning (before breakfast) for 30 days. Max Daily Amount: 37.5 mg     Dispense:  30 tablet     Refill:  0           HPI:  Rylie Delgado is a 51 y.o. (: 1975) here today for wt mgmt. Wt Readings from Last 3 Encounters:   23 197 lb 6.4 oz (89.5 kg)   23 204 lb 3.2 oz (92.6 kg)   22 203 lb 6.4 oz (92.3 kg)     BP Readings from Last 3 Encounters:   23 130/83   23 (!) 133/91   22 119/75     Started phentermine on 2022 and completed 3 month course. Started second round in 2023. For breakfast she eats fruit, for lunch she is eating chicken wrap with grapes, half chicken breast and fruit for dinner or jorge sandwich with handful of pretzels    Working out daily with [employer] and is walking a lot. PIC is sister who is also using [employer]. Review of Systems   Constitutional:  Negative for appetite change, chills, fatigue and fever. HENT:  Negative for congestion. Eyes:  Negative for pain and visual disturbance. Respiratory:  Negative for cough and shortness of breath. Cardiovascular:  Negative for chest pain and palpitations. Gastrointestinal:  Negative for abdominal pain, constipation and diarrhea.    Genitourinary: Negative for difficulty urinating. Musculoskeletal:  Negative for arthralgias. Skin:  Negative for rash and wound. Neurological:  Negative for dizziness, weakness, light-headedness and headaches. Hematological:  Does not bruise/bleed easily. Psychiatric/Behavioral:  Negative for behavioral problems. History reviewed. No pertinent past medical history. Family History   Problem Relation Age of Onset    Hypothyroidism Mother     Asthma Mother     Diabetes Mother     COPD Mother     No Known Problems Father        Social History     Tobacco Use    Smoking status: Former     Years: 2.00     Types: Cigarettes     Quit date:      Years since quittin.1    Smokeless tobacco: Never   Substance Use Topics    Alcohol use: Not Currently    Drug use: Never       New Prescriptions    No medications on file       Meds Prior to visit:  Current Outpatient Medications on File Prior to Visit   Medication Sig Dispense Refill    triamterene-hydroCHLOROthiazide (MAXZIDE-25) 37.5-25 MG per tablet Take 1 tablet by mouth daily 90 tablet 3    hydroCHLOROthiazide (MICROZIDE) 12.5 MG capsule Take 1 capsule by mouth every morning 90 capsule 3    cetirizine (ZYRTEC) 10 MG tablet TAKE 1 TABLET DAILY 90 tablet 3    fluticasone-umeclidin-vilant (TRELEGY ELLIPTA) 100-62.5-25 MCG/INH AEPB Inhale 1 puff into the lungs daily 60 each 3    atorvastatin (LIPITOR) 20 MG tablet Take 1 tablet by mouth daily 90 tablet 3    albuterol sulfate HFA (VENTOLIN HFA) 108 (90 Base) MCG/ACT inhaler INHALE 2 PUFFS BY MOUTH EVERY 6 HOURS AS NEEDED FOR WHEEZING OR SHORTNESS OF BREATH 18 g 3    montelukast (SINGULAIR) 10 MG tablet       BLISOVI FE  1-20 MG-MCG per tablet       Multiple Vitamin (MULTIVITAMIN) tablet Take 1 tablet by mouth daily       No current facility-administered medications on file prior to visit.      No Known Allergies      OBJECTIVE:  /83   Pulse 88   Temp 97 °F (36.1 °C) (Temporal)   Ht 5' 2\" (1.575 m)   Wt 197 lb 6.4 oz (89.5 kg)   LMP 02/07/2023 (Approximate)   BMI 36.10 kg/m²   BP Readings from Last 2 Encounters:   02/14/23 130/83   01/17/23 (!) 133/91     Wt Readings from Last 3 Encounters:   02/14/23 197 lb 6.4 oz (89.5 kg)   01/17/23 204 lb 3.2 oz (92.6 kg)   09/20/22 203 lb 6.4 oz (92.3 kg)       Physical Exam  Vitals reviewed. Constitutional:       General: She is not in acute distress. Appearance: Normal appearance. She is obese. She is not ill-appearing. HENT:      Head: Normocephalic and atraumatic. Right Ear: External ear normal.      Left Ear: External ear normal.      Nose: Nose normal. No congestion. Mouth/Throat:      Mouth: Mucous membranes are moist.      Pharynx: Oropharynx is clear. No oropharyngeal exudate. Eyes:      Extraocular Movements: Extraocular movements intact. Conjunctiva/sclera: Conjunctivae normal.      Pupils: Pupils are equal, round, and reactive to light. Cardiovascular:      Rate and Rhythm: Normal rate and regular rhythm. Pulses: Normal pulses. Heart sounds: Normal heart sounds. Pulmonary:      Effort: Pulmonary effort is normal. No respiratory distress. Breath sounds: Normal breath sounds. No stridor. No wheezing, rhonchi or rales. Abdominal:      General: Bowel sounds are normal.      Palpations: Abdomen is soft. Tenderness: There is no abdominal tenderness. Musculoskeletal:         General: Normal range of motion. Cervical back: Normal range of motion and neck supple. Right lower leg: No edema. Left lower leg: No edema. Skin:     General: Skin is warm. Capillary Refill: Capillary refill takes less than 2 seconds. Findings: No erythema or rash. Neurological:      General: No focal deficit present. Mental Status: She is alert and oriented to person, place, and time. Mental status is at baseline. Motor: No weakness.       Coordination: Coordination normal.      Gait: Gait normal.   Psychiatric: Mood and Affect: Mood normal.         Behavior: Behavior normal.         Thought Content: Thought content normal.         Judgment: Judgment normal.       Lab Results   Component Value Date    WBC 9.0 01/17/2023    HGB 13.3 01/17/2023    HCT 41.2 01/17/2023    MCV 84.7 01/17/2023     01/17/2023     Lab Results   Component Value Date     01/17/2023    K 3.9 01/17/2023    CL 99 01/17/2023    CO2 25 01/17/2023    BUN 11 01/17/2023    CREATININE 0.6 01/17/2023    GLUCOSE 87 01/17/2023    CALCIUM 9.2 01/17/2023    PROT 7.0 01/17/2023    LABALBU 4.3 01/17/2023    BILITOT 0.3 01/17/2023    ALKPHOS 116 01/17/2023    AST 17 01/17/2023    ALT 14 01/17/2023    LABGLOM >60 01/17/2023    GFRAA >60 05/24/2022    AGRATIO 1.6 01/17/2023    GLOB 2.7 10/20/2021     Lab Results   Component Value Date    CHOL 190 05/24/2022    CHOL 241 (H) 10/20/2021    CHOL 240 (H) 03/31/2021     Lab Results   Component Value Date    TRIG 154 (H) 05/24/2022    TRIG 152 (H) 10/20/2021    TRIG 154 (H) 03/31/2021     Lab Results   Component Value Date    HDL 59 05/24/2022    HDL 50 10/20/2021    HDL 52 03/31/2021     Lab Results   Component Value Date    LDLCALC 100 (H) 05/24/2022    LDLCALC 161 (H) 10/20/2021    LDLCALC 157 (H) 03/31/2021     Lab Results   Component Value Date    LABVLDL 31 05/24/2022    LABVLDL 30 10/20/2021    LABVLDL 31 03/31/2021     Lab Results   Component Value Date    LABA1C 5.4 05/24/2022         Discussed use, benefit, and side effects of prescribed medications. Barriers to medication compliance addressed. All patient questions answered. Pt voiced understanding. RTC Return in about 4 weeks (around 3/14/2023).     Future Appointments   Date Time Provider Tameka Dumont   3/14/2023  7:00 AM MD Michael Coulter MD  2/14/2023  7:22 AM

## 2023-03-14 ENCOUNTER — OFFICE VISIT (OUTPATIENT)
Dept: PRIMARY CARE CLINIC | Age: 48
End: 2023-03-14
Payer: COMMERCIAL

## 2023-03-14 VITALS
DIASTOLIC BLOOD PRESSURE: 89 MMHG | HEART RATE: 85 BPM | TEMPERATURE: 97 F | HEIGHT: 62 IN | SYSTOLIC BLOOD PRESSURE: 132 MMHG | BODY MASS INDEX: 36.14 KG/M2 | WEIGHT: 196.4 LBS

## 2023-03-14 DIAGNOSIS — E66.01 CLASS 2 SEVERE OBESITY DUE TO EXCESS CALORIES WITH SERIOUS COMORBIDITY AND BODY MASS INDEX (BMI) OF 37.0 TO 37.9 IN ADULT (HCC): ICD-10-CM

## 2023-03-14 PROCEDURE — 3075F SYST BP GE 130 - 139MM HG: CPT | Performed by: FAMILY MEDICINE

## 2023-03-14 PROCEDURE — 3079F DIAST BP 80-89 MM HG: CPT | Performed by: FAMILY MEDICINE

## 2023-03-14 PROCEDURE — 99214 OFFICE O/P EST MOD 30 MIN: CPT | Performed by: FAMILY MEDICINE

## 2023-03-14 RX ORDER — PHENTERMINE HYDROCHLORIDE 37.5 MG/1
37.5 TABLET ORAL
Qty: 30 TABLET | Refills: 0 | Status: SHIPPED | OUTPATIENT
Start: 2023-03-14 | End: 2023-04-13

## 2023-03-14 ASSESSMENT — ENCOUNTER SYMPTOMS
EYE PAIN: 0
DIARRHEA: 0
SHORTNESS OF BREATH: 0
CONSTIPATION: 0
COUGH: 0
ABDOMINAL PAIN: 0

## 2023-03-14 NOTE — PROGRESS NOTES
Chief Complaint   Patient presents with    Weight Management         ASSESSMENT/PLAN:  1.  Class II obesity due to excess calories without serious comorbidity with body mass index of 37.0-37.9 in adult  Complicating all aspects of patient care.  PDMP reviewed- No suspicious activity  Follow up in 4 weeks to continue to monitor and gauge improvement in weight  Last refill is 2023    2. Hypertension, unspecified type  Controlled on regimen   Continue Maxide with extra HCTZ  Follow-up in 4 weeks to gauge improvement with weight loss in hopes to decrease regimen      Orders Placed This Encounter    phentermine (ADIPEX-P) 37.5 MG tablet     Sig: Take 1 tablet by mouth every morning (before breakfast) for 30 days. Max Daily Amount: 37.5 mg     Dispense:  30 tablet     Refill:  0             HPI:  Mell is a 48 y.o. (: 1975) here today for wt mgmt.     Wt Readings from Last 3 Encounters:   23 196 lb 6.4 oz (89.1 kg)   23 197 lb 6.4 oz (89.5 kg)   23 204 lb 3.2 oz (92.6 kg)     BP Readings from Last 3 Encounters:   23 132/89   23 130/83   23 (!) 133/91     Started phentermine on 2022 and completed 3 month course.   Started second round in 2023.     For breakfast she eats fruit, for lunch she is eating chicken wrap with grapes, half chicken breast and fruit for dinner or jorge sandwich with handful of pretzels    Working out daily with [employer] and is walking a lot. PIC is sister who is also using [employer].     Review of Systems   Constitutional:  Negative for appetite change, chills, fatigue and fever.   HENT:  Negative for congestion.    Eyes:  Negative for pain and visual disturbance.   Respiratory:  Negative for cough and shortness of breath.    Cardiovascular:  Negative for chest pain and palpitations.   Gastrointestinal:  Negative for abdominal pain, constipation and diarrhea.   Genitourinary:  Negative for difficulty urinating.   Musculoskeletal:  Negative  for arthralgias. Skin:  Negative for rash and wound. Neurological:  Negative for dizziness, weakness, light-headedness and headaches. Hematological:  Does not bruise/bleed easily. Psychiatric/Behavioral:  Negative for behavioral problems. History reviewed. No pertinent past medical history. Family History   Problem Relation Age of Onset    Hypothyroidism Mother     Asthma Mother     Diabetes Mother     COPD Mother     No Known Problems Father        Social History     Tobacco Use    Smoking status: Former     Years: 2.00     Types: Cigarettes     Quit date:      Years since quittin.2    Smokeless tobacco: Never   Substance Use Topics    Alcohol use: Not Currently    Drug use: Never       New Prescriptions    No medications on file       Meds Prior to visit:  Current Outpatient Medications on File Prior to Visit   Medication Sig Dispense Refill    triamterene-hydroCHLOROthiazide (MAXZIDE-25) 37.5-25 MG per tablet Take 1 tablet by mouth daily 90 tablet 3    hydroCHLOROthiazide (MICROZIDE) 12.5 MG capsule Take 1 capsule by mouth every morning 90 capsule 3    cetirizine (ZYRTEC) 10 MG tablet TAKE 1 TABLET DAILY 90 tablet 3    fluticasone-umeclidin-vilant (TRELEGY ELLIPTA) 100-62.5-25 MCG/INH AEPB Inhale 1 puff into the lungs daily 60 each 3    atorvastatin (LIPITOR) 20 MG tablet Take 1 tablet by mouth daily 90 tablet 3    albuterol sulfate HFA (VENTOLIN HFA) 108 (90 Base) MCG/ACT inhaler INHALE 2 PUFFS BY MOUTH EVERY 6 HOURS AS NEEDED FOR WHEEZING OR SHORTNESS OF BREATH 18 g 3    montelukast (SINGULAIR) 10 MG tablet       BLISOVI FE  1-20 MG-MCG per tablet       Multiple Vitamin (MULTIVITAMIN) tablet Take 1 tablet by mouth daily       No current facility-administered medications on file prior to visit.      No Known Allergies      OBJECTIVE:  /89   Pulse 85   Temp 97 °F (36.1 °C) (Temporal)   Ht 5' 2\" (1.575 m)   Wt 196 lb 6.4 oz (89.1 kg)   LMP 2023 (Approximate)   BMI 35.92 kg/m²   BP Readings from Last 2 Encounters:   03/14/23 132/89   02/14/23 130/83     Wt Readings from Last 3 Encounters:   03/14/23 196 lb 6.4 oz (89.1 kg)   02/14/23 197 lb 6.4 oz (89.5 kg)   01/17/23 204 lb 3.2 oz (92.6 kg)       Physical Exam  Vitals reviewed. Constitutional:       General: She is not in acute distress. Appearance: Normal appearance. She is obese. She is not ill-appearing. HENT:      Head: Normocephalic and atraumatic. Right Ear: External ear normal.      Left Ear: External ear normal.      Nose: Nose normal. No congestion. Mouth/Throat:      Mouth: Mucous membranes are moist.      Pharynx: Oropharynx is clear. No oropharyngeal exudate. Eyes:      Extraocular Movements: Extraocular movements intact. Conjunctiva/sclera: Conjunctivae normal.      Pupils: Pupils are equal, round, and reactive to light. Cardiovascular:      Rate and Rhythm: Normal rate and regular rhythm. Pulses: Normal pulses. Heart sounds: Normal heart sounds. Pulmonary:      Effort: Pulmonary effort is normal. No respiratory distress. Breath sounds: Normal breath sounds. No stridor. No wheezing, rhonchi or rales. Abdominal:      General: Bowel sounds are normal.      Palpations: Abdomen is soft. Tenderness: There is no abdominal tenderness. Musculoskeletal:         General: Normal range of motion. Cervical back: Normal range of motion and neck supple. Right lower leg: No edema. Left lower leg: No edema. Skin:     General: Skin is warm. Capillary Refill: Capillary refill takes less than 2 seconds. Findings: No erythema or rash. Neurological:      General: No focal deficit present. Mental Status: She is alert and oriented to person, place, and time. Mental status is at baseline. Motor: No weakness.       Coordination: Coordination normal.      Gait: Gait normal.   Psychiatric:         Mood and Affect: Mood normal.         Behavior: Behavior normal.         Thought Content: Thought content normal.         Judgment: Judgment normal.       Lab Results   Component Value Date    WBC 9.0 01/17/2023    HGB 13.3 01/17/2023    HCT 41.2 01/17/2023    MCV 84.7 01/17/2023     01/17/2023     Lab Results   Component Value Date     01/17/2023    K 3.9 01/17/2023    CL 99 01/17/2023    CO2 25 01/17/2023    BUN 11 01/17/2023    CREATININE 0.6 01/17/2023    GLUCOSE 87 01/17/2023    CALCIUM 9.2 01/17/2023    PROT 7.0 01/17/2023    LABALBU 4.3 01/17/2023    BILITOT 0.3 01/17/2023    ALKPHOS 116 01/17/2023    AST 17 01/17/2023    ALT 14 01/17/2023    LABGLOM >60 01/17/2023    GFRAA >60 05/24/2022    AGRATIO 1.6 01/17/2023    GLOB 2.7 10/20/2021     Lab Results   Component Value Date    CHOL 190 05/24/2022    CHOL 241 (H) 10/20/2021    CHOL 240 (H) 03/31/2021     Lab Results   Component Value Date    TRIG 154 (H) 05/24/2022    TRIG 152 (H) 10/20/2021    TRIG 154 (H) 03/31/2021     Lab Results   Component Value Date    HDL 59 05/24/2022    HDL 50 10/20/2021    HDL 52 03/31/2021     Lab Results   Component Value Date    LDLCALC 100 (H) 05/24/2022    LDLCALC 161 (H) 10/20/2021    LDLCALC 157 (H) 03/31/2021     Lab Results   Component Value Date    LABVLDL 31 05/24/2022    LABVLDL 30 10/20/2021    LABVLDL 31 03/31/2021     Lab Results   Component Value Date    LABA1C 5.4 05/24/2022         Discussed use, benefit, and side effects of prescribed medications. Barriers to medication compliance addressed. All patient questions answered. Pt voiced understanding. RTC No follow-ups on file. No future appointments.             Torres Stewart MD  3/14/2023  7:29 AM

## 2023-03-21 ENCOUNTER — TELEPHONE (OUTPATIENT)
Dept: PRIMARY CARE CLINIC | Age: 48
End: 2023-03-21

## 2023-03-21 DIAGNOSIS — I10 ESSENTIAL HYPERTENSION: ICD-10-CM

## 2023-03-21 DIAGNOSIS — I10 HYPERTENSION, UNSPECIFIED TYPE: ICD-10-CM

## 2023-03-21 RX ORDER — TRIAMTERENE AND HYDROCHLOROTHIAZIDE 37.5; 25 MG/1; MG/1
1 TABLET ORAL DAILY
Qty: 90 TABLET | Refills: 3 | Status: SHIPPED | OUTPATIENT
Start: 2023-03-21

## 2023-03-21 RX ORDER — HYDROCHLOROTHIAZIDE 12.5 MG/1
12.5 CAPSULE, GELATIN COATED ORAL EVERY MORNING
Qty: 90 CAPSULE | Refills: 3 | Status: SHIPPED | OUTPATIENT
Start: 2023-03-21 | End: 2024-03-15

## 2023-03-21 NOTE — TELEPHONE ENCOUNTER
Tao Smith from [address] [place] [address] called-     Asking for refills     hydroCHLOROthiazide (MICROZIDE) 12.5 MG capsule       triamterene-hydroCHLOROthiazide (MAXZIDE-25) 37.5-25 MG       [address] [place] [address] Ave, [address]

## 2023-03-21 NOTE — PROGRESS NOTES
Medication:   Requested Prescriptions     Pending Prescriptions Disp Refills    hydroCHLOROthiazide (MICROZIDE) 12.5 MG capsule 90 capsule 3     Sig: Take 1 capsule by mouth every morning    triamterene-hydroCHLOROthiazide (MAXZIDE-25) 37.5-25 MG per tablet 90 tablet 3     Sig: Take 1 tablet by mouth daily        Last Filled:  12/15/22    Patient Phone Number: [phone] (home)     Last appt: 3/14/2023   Next appt: 4/11/2023    Last OARRS: No flowsheet data found.

## 2023-04-18 ASSESSMENT — ENCOUNTER SYMPTOMS
EYE PAIN: 0
DIARRHEA: 0
COUGH: 0
CONSTIPATION: 0
SHORTNESS OF BREATH: 0
ABDOMINAL PAIN: 0

## 2023-04-19 ENCOUNTER — PATIENT MESSAGE (OUTPATIENT)
Dept: PRIMARY CARE CLINIC | Age: 48
End: 2023-04-19

## 2023-04-19 ENCOUNTER — OFFICE VISIT (OUTPATIENT)
Dept: PRIMARY CARE CLINIC | Age: 48
End: 2023-04-19
Payer: COMMERCIAL

## 2023-04-19 VITALS
HEART RATE: 96 BPM | SYSTOLIC BLOOD PRESSURE: 123 MMHG | DIASTOLIC BLOOD PRESSURE: 76 MMHG | BODY MASS INDEX: 35.78 KG/M2 | WEIGHT: 195.6 LBS

## 2023-04-19 DIAGNOSIS — E66.01 CLASS 2 SEVERE OBESITY DUE TO EXCESS CALORIES WITH SERIOUS COMORBIDITY AND BODY MASS INDEX (BMI) OF 35.0 TO 35.9 IN ADULT (HCC): Primary | ICD-10-CM

## 2023-04-19 DIAGNOSIS — I10 ESSENTIAL HYPERTENSION: ICD-10-CM

## 2023-04-19 PROCEDURE — 3074F SYST BP LT 130 MM HG: CPT | Performed by: FAMILY MEDICINE

## 2023-04-19 PROCEDURE — 3078F DIAST BP <80 MM HG: CPT | Performed by: FAMILY MEDICINE

## 2023-04-19 PROCEDURE — 99214 OFFICE O/P EST MOD 30 MIN: CPT | Performed by: FAMILY MEDICINE

## 2023-04-19 RX ORDER — SEMAGLUTIDE 1.34 MG/ML
1 INJECTION, SOLUTION SUBCUTANEOUS WEEKLY
Qty: 3 ML | Refills: 3 | Status: SHIPPED | OUTPATIENT
Start: 2023-04-19 | End: 2023-04-19

## 2023-04-19 NOTE — TELEPHONE ENCOUNTER
From: Michelle Gerber  To: Dr. Deja Hooks: 4/19/2023 1:37 PM EDT  Subject: Ozempic    Good afternoon i am sorry to say my insurance will not cover ozempic so i am not sure what to do now . the pharmacist at [facility] said to ask you about a pen called Mounjaro she said if it checks out with you we might have a chance with the insurance with that one .let me know what you think about that and thank you again  for all your help    Fidel Notrh

## 2023-04-19 NOTE — PROGRESS NOTES
96   Wt 195 lb 9.6 oz (88.7 kg)   LMP 04/15/2023   BMI 35.78 kg/m²   BP Readings from Last 2 Encounters:   04/19/23 123/76   03/14/23 132/89     Wt Readings from Last 3 Encounters:   04/19/23 195 lb 9.6 oz (88.7 kg)   03/14/23 196 lb 6.4 oz (89.1 kg)   02/14/23 197 lb 6.4 oz (89.5 kg)       Physical Exam  Vitals reviewed. Constitutional:       General: She is not in acute distress. Appearance: Normal appearance. She is obese. She is not ill-appearing. HENT:      Head: Normocephalic and atraumatic. Right Ear: External ear normal.      Left Ear: External ear normal.      Nose: Nose normal. No congestion. Mouth/Throat:      Mouth: Mucous membranes are moist.      Pharynx: Oropharynx is clear. No oropharyngeal exudate. Eyes:      Extraocular Movements: Extraocular movements intact. Conjunctiva/sclera: Conjunctivae normal.      Pupils: Pupils are equal, round, and reactive to light. Cardiovascular:      Rate and Rhythm: Normal rate and regular rhythm. Pulses: Normal pulses. Heart sounds: Normal heart sounds. Pulmonary:      Effort: Pulmonary effort is normal. No respiratory distress. Breath sounds: Normal breath sounds. No stridor. No wheezing, rhonchi or rales. Abdominal:      General: Bowel sounds are normal.      Palpations: Abdomen is soft. Tenderness: There is no abdominal tenderness. Musculoskeletal:         General: Normal range of motion. Cervical back: Normal range of motion and neck supple. Right lower leg: No edema. Left lower leg: No edema. Skin:     General: Skin is warm. Capillary Refill: Capillary refill takes less than 2 seconds. Findings: No erythema or rash. Neurological:      General: No focal deficit present. Mental Status: She is alert and oriented to person, place, and time. Mental status is at baseline. Motor: No weakness.       Coordination: Coordination normal.      Gait: Gait normal.   Psychiatric:

## 2023-04-24 ENCOUNTER — TELEPHONE (OUTPATIENT)
Dept: PRIMARY CARE CLINIC | Age: 48
End: 2023-04-24

## 2023-04-24 NOTE — TELEPHONE ENCOUNTER
Tirzepatide [facility]) 2.5 MG/0.5ML SOPN SC injection [[id number]]    Order Details      Dose: 2.5 mg Route: SubCUTAneous Frequency: WEEKLY   Dispense Quantity: 2 mL Refills: 3          Sig: Inject 0.5 mLs into the skin once a week         Start Date: 04/24/23 End Date: 05/24/23 after 5 doses   Written Date: 04/24/23 Expiration Date: 04/23/24       Diagnosis Association: Class 2 severe obesity due to excess calories with serious comorbidity and body mass index (BMI) of 35.0 to 35.9 in adult [facility]) (E66.01 , Z68.35)   Original Order:  Tirzepatide [facility]) 2.5 MG/0.5ML SOPN SC injection [[id number]]         Providers      Authorizing Provider: Lovey Dancer, MD NPI: [id number]   Ordering User:  Lovey Dancer, MD             Pharmacy      [facility]pharm[facility] #[id number] - [place], [phone] - F [phone] [address] [facility] [address]   Phone:  [phone]  Fax:  [phone]

## 2023-04-24 NOTE — TELEPHONE ENCOUNTER
Submitted PA for Mounjaro 2.5MG/0.5ML pen-injectors  Via [facility] Key: [id number] STATUS: Product not covered by this plan for this diagnosis. If this requires a response please respond to the pool ( P MHCX [facility]). Thank you please advise patient.

## 2023-04-25 ENCOUNTER — TELEPHONE (OUTPATIENT)
Dept: PRIMARY CARE CLINIC | Age: 48
End: 2023-04-25

## 2023-04-25 NOTE — TELEPHONE ENCOUNTER
Tirzepatide [facility]) 2.5 MG/0.5ML SOPN SC injection [[id number]]    Order Details      Dose: 2.5 mg Route: SubCUTAneous Frequency: WEEKLY   Dispense Quantity: 2 mL Refills: 3          Sig: Inject 0.5 mLs into the skin once a week         Start Date: 04/24/23 End Date: 05/24/23 after 5 doses   Written Date: 04/24/23 Expiration Date: 04/23/24       Diagnosis Association: Class 2 severe obesity due to excess calories with serious comorbidity and body mass index (BMI) of 35.0 to 35.9 in adult [facility]) (E66.01 , Z68.35)   Original Order:  Tirzepatide [facility]) 2.5 MG/0.5ML SOPN SC injection [[id number]]         Providers      Authorizing Provider: Lovey Dancer, MD NPI: [id number]   Ordering User:  Lovey Dancer, MD             Pharmacy      [id number] - [address], [address] [phone] - F [phone]    [address] [facility], [address] [place] [address]   Phone:  [phone]  Fax:  [phone]

## 2023-04-28 ENCOUNTER — TELEPHONE (OUTPATIENT)
Dept: PRIMARY CARE CLINIC | Age: 48
End: 2023-04-28

## 2023-05-01 DIAGNOSIS — J45.41 MODERATE PERSISTENT ASTHMA WITH EXACERBATION: ICD-10-CM

## 2023-05-01 DIAGNOSIS — E78.2 MIXED HYPERLIPIDEMIA: ICD-10-CM

## 2023-05-01 RX ORDER — ATORVASTATIN CALCIUM 20 MG/1
20 TABLET, FILM COATED ORAL DAILY
Qty: 90 TABLET | Refills: 3 | Status: SHIPPED | OUTPATIENT
Start: 2023-05-01

## 2023-05-01 RX ORDER — ALBUTEROL SULFATE 90 UG/1
AEROSOL, METERED RESPIRATORY (INHALATION)
Qty: 18 G | Refills: 3 | Status: SHIPPED | OUTPATIENT
Start: 2023-05-01 | End: 2023-05-03 | Stop reason: SDUPTHER

## 2023-05-01 NOTE — PROGRESS NOTES
Medication:   Requested Prescriptions     Pending Prescriptions Disp Refills    atorvastatin (LIPITOR) 20 MG tablet 90 tablet 3     Sig: Take 1 tablet by mouth daily    albuterol sulfate HFA (VENTOLIN HFA) 108 (90 Base) MCG/ACT inhaler 18 g 3     Sig: INHALE 2 PUFFS BY MOUTH EVERY 6 HOURS AS NEEDED FOR WHEEZING OR SHORTNESS OF BREATH        Last Filled:  05/11/22    Patient Phone Number: [phone] (home)     Last appt: 4/19/2023   Next appt: Visit date not found    Last OARRS: No flowsheet data found.

## 2023-05-03 ENCOUNTER — TELEPHONE (OUTPATIENT)
Dept: PRIMARY CARE CLINIC | Age: 48
End: 2023-05-03

## 2023-05-03 DIAGNOSIS — J45.41 MODERATE PERSISTENT ASTHMA WITH EXACERBATION: ICD-10-CM

## 2023-05-03 RX ORDER — ALBUTEROL SULFATE 90 UG/1
AEROSOL, METERED RESPIRATORY (INHALATION)
Qty: 54 G | Refills: 1 | Status: SHIPPED | OUTPATIENT
Start: 2023-05-03

## 2023-05-03 NOTE — TELEPHONE ENCOUNTER
[facility] from [facility] called - They need update script for albuterol inhaler- issue is they have script on file for 30 day but insurance will only cover 90 day so they need 90 day sent.

## 2023-05-15 ENCOUNTER — TELEPHONE (OUTPATIENT)
Dept: PRIMARY CARE CLINIC | Age: 48
End: 2023-05-15

## 2023-05-16 ENCOUNTER — TELEPHONE (OUTPATIENT)
Dept: PRIMARY CARE CLINIC | Age: 48
End: 2023-05-16

## 2023-05-16 NOTE — TELEPHONE ENCOUNTER
Medication  Semaglutide-Weight Management (WEGOVY) 0.25 MG/0.5ML SOAJ SC injection [[id number]]  Semaglutide-Weight Management (WEGOVY) 0.25 MG/0.5ML SOAJ SC injection [[id number]]     Order Details  Dose: 0.25 mg Route: SubCUTAneous Frequency: EVERY 7 DAYS   Dispense Quantity: 2 mL Refills: 0          Sig: Inject 0.25 mg into the skin every 7 days         Start Date: 05/16/23 End Date: 06/15/23 after 5 doses   Written Date: 05/16/23 Expiration Date: 05/15/24       Diagnosis Association: Class 2 severe obesity due to excess calories with serious comorbidity and body mass index (BMI) of 37.0 to 37.9 in adult [facility]) (E66.01 , F95.16)   Providers    Authorizing Provider: Promise Palacio MD NPI: [id number]   Ordering User:  Promise Palacio MD          Pharmacy    [id number] - [address], [address] [place] [address]  [address] [place] [address] [phone] -  [phone]   [address] [facility] [address] [place] [address]   Phone:  [phone]  Fax:  [phone]

## 2023-05-17 NOTE — TELEPHONE ENCOUNTER
Submitted PA for Wegovy 0.25MG/0.5ML auto-injectors  [facility]ia [facility] Key: [id number] STATUS: Your request was APPROVED based on the initial information provided at the time of the coverage request submission. Please allow additional time for the final decision to be made and added to the patient's account. If this requires a response please respond to the pool ( P MHCX [address] [facility]). Thank you please advise patient.

## 2023-05-18 NOTE — TELEPHONE ENCOUNTER
Received attached APPROVAL letter for [facility] AYO 0.25MG/0.5ML auto-injectors. Approval is granted from 05/17/23 - 05/17/24.

## 2023-06-01 ENCOUNTER — PATIENT MESSAGE (OUTPATIENT)
Dept: PRIMARY CARE CLINIC | Age: 48
End: 2023-06-01

## 2023-06-01 DIAGNOSIS — E66.01 CLASS 2 SEVERE OBESITY DUE TO EXCESS CALORIES WITH SERIOUS COMORBIDITY AND BODY MASS INDEX (BMI) OF 37.0 TO 37.9 IN ADULT (HCC): ICD-10-CM

## 2023-06-01 RX ORDER — SEMAGLUTIDE 0.25 MG/.5ML
0.25 INJECTION, SOLUTION SUBCUTANEOUS
Qty: 2 ML | Refills: 0 | Status: SHIPPED | OUTPATIENT
Start: 2023-06-01 | End: 2023-06-01 | Stop reason: SDUPTHER

## 2023-06-01 RX ORDER — SEMAGLUTIDE 0.25 MG/.5ML
0.25 INJECTION, SOLUTION SUBCUTANEOUS
Qty: 4 ML | Refills: 0 | Status: SHIPPED | OUTPATIENT
Start: 2023-06-01 | End: 2023-08-01

## 2023-06-01 NOTE — TELEPHONE ENCOUNTER
From: Tanna Byrnes  To: Dr. Wilder Zapata: 2023 1:59 PM EDT  Subject: Jami Preston to bother you again but i requested a wegovy prescription to be sent to [address] [place] [address] and your office did thank you . Francisca Barillas but now [place] says they will not cover it unless its a 60 day supply and im not sure what to do from here . i dont understand insurance lol . can you help .thank you    Dillon Perdue

## 2023-06-01 NOTE — TELEPHONE ENCOUNTER
Medication:   Requested Prescriptions     Pending Prescriptions Disp Refills    Semaglutide-Weight Management (WEGOVY) 0.25 MG/0.5ML SOAJ SC injection 2 mL 0     Sig: Inject 0.25 mg into the skin every 7 days        Last Filled:  5/16/23    Patient Phone Number: [phone] (home)     Last appt: 4/19/2023   Next appt: Visit date not found    Last OARRS: No flowsheet data found.

## 2023-06-22 ENCOUNTER — OFFICE VISIT (OUTPATIENT)
Dept: PRIMARY CARE CLINIC | Age: 48
End: 2023-06-22
Payer: COMMERCIAL

## 2023-06-22 VITALS
WEIGHT: 203 LBS | BODY MASS INDEX: 37.13 KG/M2 | DIASTOLIC BLOOD PRESSURE: 89 MMHG | SYSTOLIC BLOOD PRESSURE: 132 MMHG | HEART RATE: 90 BPM

## 2023-06-22 DIAGNOSIS — E66.01 CLASS 2 SEVERE OBESITY DUE TO EXCESS CALORIES WITH SERIOUS COMORBIDITY AND BODY MASS INDEX (BMI) OF 37.0 TO 37.9 IN ADULT (HCC): Primary | ICD-10-CM

## 2023-06-22 DIAGNOSIS — E66.01 CLASS 2 SEVERE OBESITY DUE TO EXCESS CALORIES WITH SERIOUS COMORBIDITY AND BODY MASS INDEX (BMI) OF 37.0 TO 37.9 IN ADULT (HCC): ICD-10-CM

## 2023-06-22 PROCEDURE — 3075F SYST BP GE 130 - 139MM HG: CPT | Performed by: FAMILY MEDICINE

## 2023-06-22 PROCEDURE — 3079F DIAST BP 80-89 MM HG: CPT | Performed by: FAMILY MEDICINE

## 2023-06-22 PROCEDURE — 99214 OFFICE O/P EST MOD 30 MIN: CPT | Performed by: FAMILY MEDICINE

## 2023-06-22 RX ORDER — SEMAGLUTIDE 0.5 MG/.5ML
0.5 INJECTION, SOLUTION SUBCUTANEOUS
Qty: 2.11 ML | Refills: 0 | Status: SHIPPED | OUTPATIENT
Start: 2023-06-22 | End: 2023-07-22

## 2023-06-22 RX ORDER — SEMAGLUTIDE 0.5 MG/.5ML
0.5 INJECTION, SOLUTION SUBCUTANEOUS
Qty: 2 ML | Refills: 0 | Status: SHIPPED | OUTPATIENT
Start: 2023-06-22 | End: 2023-06-22

## 2023-06-22 RX ORDER — SEMAGLUTIDE 0.5 MG/.5ML
0.5 INJECTION, SOLUTION SUBCUTANEOUS
Qty: 2.11 ML | Refills: 0 | Status: SHIPPED | OUTPATIENT
Start: 2023-06-22 | End: 2023-06-22

## 2023-06-22 RX ORDER — SEMAGLUTIDE 0.5 MG/.5ML
0.5 INJECTION, SOLUTION SUBCUTANEOUS
Qty: 6 ML | Refills: 0 | Status: CANCELLED | OUTPATIENT
Start: 2023-06-22 | End: 2023-09-20

## 2023-06-22 ASSESSMENT — ENCOUNTER SYMPTOMS
EYE PAIN: 0
ABDOMINAL PAIN: 0
CONSTIPATION: 0
SHORTNESS OF BREATH: 0
COUGH: 0
DIARRHEA: 0

## 2023-06-22 NOTE — TELEPHONE ENCOUNTER
----- Message from Sonal Nguyen sent at 6/22/2023  2:32 PM EDT -----  Regarding: Erin Jeet: [phone]  Hello i was in there earlier  for a wegovy  refill and Anya Sanchez  says it was for a 28 day supply  and it needs to be gor a 3 month  supply  can you please  let me no how to fix this

## 2023-06-22 NOTE — TELEPHONE ENCOUNTER
Medication:   Requested Prescriptions     Pending Prescriptions Disp Refills    Semaglutide-Weight Management (WEGOVY) 0.5 MG/0.5ML SOAJ SC injection 6 mL 0     Sig: Inject 0.5 mg into the skin every 7 days        Last Filled:      Patient Phone Number: [phone] (home)     Last appt: 6/22/2023   Next appt: 7/20/2023    Last OARRS: No flowsheet data found.

## 2023-07-24 ASSESSMENT — ENCOUNTER SYMPTOMS
EYE PAIN: 0
SHORTNESS OF BREATH: 0
ABDOMINAL PAIN: 0
COUGH: 0
CONSTIPATION: 0
DIARRHEA: 0

## 2023-07-25 ENCOUNTER — OFFICE VISIT (OUTPATIENT)
Dept: PRIMARY CARE CLINIC | Age: 48
End: 2023-07-25
Payer: COMMERCIAL

## 2023-07-25 VITALS
WEIGHT: 205.6 LBS | BODY MASS INDEX: 37.6 KG/M2 | HEART RATE: 92 BPM | SYSTOLIC BLOOD PRESSURE: 137 MMHG | DIASTOLIC BLOOD PRESSURE: 84 MMHG

## 2023-07-25 DIAGNOSIS — E66.01 CLASS 2 SEVERE OBESITY DUE TO EXCESS CALORIES WITH SERIOUS COMORBIDITY AND BODY MASS INDEX (BMI) OF 37.0 TO 37.9 IN ADULT (HCC): Primary | ICD-10-CM

## 2023-07-25 DIAGNOSIS — I10 ESSENTIAL HYPERTENSION: ICD-10-CM

## 2023-07-25 PROCEDURE — 3079F DIAST BP 80-89 MM HG: CPT | Performed by: FAMILY MEDICINE

## 2023-07-25 PROCEDURE — 99214 OFFICE O/P EST MOD 30 MIN: CPT | Performed by: FAMILY MEDICINE

## 2023-07-25 PROCEDURE — 3075F SYST BP GE 130 - 139MM HG: CPT | Performed by: FAMILY MEDICINE

## 2023-07-25 NOTE — PROGRESS NOTES
Chief Complaint   Patient presents with    Weight Management       ASSESSMENT/PLAN:  1. Class II obesity due to excess calories with serious comorbidity with body mass index of 48-47.0 in adult  Complicating all aspects of patient care. Third round of adipex started on 2023  Continue adipex for 3 months. Normal BP. PDMP reviewed and not suspicious   Follow up in 4 weeks to gauge improvement and continue if she is losing weight. 2. Hypertension, unspecified type  Controlled   Continue Maxide with extra HCTZ  Follow-up in 4-6 weeks to gauge improvement with weight loss in hopes to decrease regimen  Update CMP at follow up. No orders of the defined types were placed in this encounter. HPI:  Carlos Bhagat is a 51 y.o. (: 1975) here today for wt mgmt. On wegovy for 2 weeks. Wt Readings from Last 3 Encounters:   23 205 lb 9.6 oz (93.3 kg)   23 203 lb (92.1 kg)   23 195 lb 9.6 oz (88.7 kg)     BP Readings from Last 3 Encounters:   23 137/84   23 132/89   23 123/76   HTN: maxide and extra HCTZ    Typical diet:  - Breakfast: 1 ensure protein shake 130 wanda  - Lunch: 1c purple grapes, 1/2 cheese and water  - Dinner: 1 protein shake 130 wanda  - Snacks: turkey keto wrap with irina   - Drink: water    Working out daily with [employer] and is walking a lot. PIC is sister who is also using [employer]. Joined a [employer] club. Walks 3 miles a day except on Sundays. First round of adipex in 2023 and completed 3 month course. Second round completed in 2023. Third round of adipex started on 2023    Review of Systems   Constitutional:  Negative for appetite change, chills, fatigue and fever. HENT:  Negative for congestion. Eyes:  Negative for pain and visual disturbance. Respiratory:  Negative for cough and shortness of breath. Cardiovascular:  Negative for chest pain and palpitations.    Gastrointestinal:  Negative for abdominal pain, constipation

## 2023-08-05 DIAGNOSIS — J45.40 MODERATE PERSISTENT ASTHMA WITHOUT COMPLICATION: ICD-10-CM

## 2023-08-07 RX ORDER — FLUTICASONE FUROATE, UMECLIDINIUM BROMIDE AND VILANTEROL TRIFENATATE 100; 62.5; 25 UG/1; UG/1; UG/1
POWDER RESPIRATORY (INHALATION)
Qty: 120 EACH | Refills: 0 | Status: SHIPPED | OUTPATIENT
Start: 2023-08-07

## 2023-08-07 NOTE — TELEPHONE ENCOUNTER
Medication:   Requested Prescriptions     Pending Prescriptions Disp Refills    TRELEGY ELLIPTA 100-62.5-25 MCG/ACT AEPB inhaler [Pharmacy Med Name: Bruno Hahn 100mcg-62. 5mcg-25mcg/actuation Powder for Inhalation]  0     Sig: Inhale 1 puff by mouth daily. Last Filled:  09/20/22    Patient Phone Number: [phone] (home)     Last appt: 7/25/2023   Next appt: 8/22/2023    Last OARRS: No flowsheet data found.

## 2023-08-10 DIAGNOSIS — E66.01 CLASS 2 SEVERE OBESITY DUE TO EXCESS CALORIES WITH SERIOUS COMORBIDITY AND BODY MASS INDEX (BMI) OF 37.0 TO 37.9 IN ADULT (HCC): ICD-10-CM

## 2023-08-10 RX ORDER — PHENTERMINE HYDROCHLORIDE 37.5 MG/1
37.5 TABLET ORAL
Qty: 30 TABLET | Refills: 0 | Status: SHIPPED | OUTPATIENT
Start: 2023-08-10 | End: 2023-09-09

## 2023-08-22 ENCOUNTER — OFFICE VISIT (OUTPATIENT)
Dept: PRIMARY CARE CLINIC | Age: 48
End: 2023-08-22
Payer: COMMERCIAL

## 2023-08-22 VITALS
SYSTOLIC BLOOD PRESSURE: 131 MMHG | DIASTOLIC BLOOD PRESSURE: 86 MMHG | BODY MASS INDEX: 37.73 KG/M2 | TEMPERATURE: 97.2 F | WEIGHT: 205 LBS | HEIGHT: 62 IN | HEART RATE: 84 BPM | OXYGEN SATURATION: 99 %

## 2023-08-22 DIAGNOSIS — I10 ESSENTIAL HYPERTENSION: ICD-10-CM

## 2023-08-22 DIAGNOSIS — E66.9 OBESITY (BMI 30-39.9): Primary | ICD-10-CM

## 2023-08-22 PROBLEM — J45.41 MODERATE PERSISTENT ASTHMA WITH EXACERBATION: Status: RESOLVED | Noted: 2022-05-15 | Resolved: 2023-08-22

## 2023-08-22 PROBLEM — R79.89 ELEVATED TSH: Status: RESOLVED | Noted: 2019-02-22 | Resolved: 2023-08-22

## 2023-08-22 PROCEDURE — 99214 OFFICE O/P EST MOD 30 MIN: CPT | Performed by: FAMILY MEDICINE

## 2023-08-22 PROCEDURE — 3075F SYST BP GE 130 - 139MM HG: CPT | Performed by: FAMILY MEDICINE

## 2023-08-22 PROCEDURE — 3079F DIAST BP 80-89 MM HG: CPT | Performed by: FAMILY MEDICINE

## 2023-08-22 RX ORDER — PHENTERMINE HYDROCHLORIDE 37.5 MG/1
37.5 TABLET ORAL
Qty: 30 TABLET | Refills: 0 | Status: SHIPPED | OUTPATIENT
Start: 2023-08-22 | End: 2023-08-24 | Stop reason: SDUPTHER

## 2023-08-22 ASSESSMENT — ENCOUNTER SYMPTOMS
DIARRHEA: 0
SHORTNESS OF BREATH: 0
CONSTIPATION: 0
COUGH: 0
EYE PAIN: 0
ABDOMINAL PAIN: 0

## 2023-08-22 NOTE — PROGRESS NOTES
Gait normal.   Psychiatric:         Mood and Affect: Mood normal.         Behavior: Behavior normal.         Thought Content: Thought content normal.         Judgment: Judgment normal.       Lab Results   Component Value Date    WBC 9.0 01/17/2023    HGB 13.3 01/17/2023    HCT 41.2 01/17/2023    MCV 84.7 01/17/2023     01/17/2023     Lab Results   Component Value Date     01/17/2023    K 3.9 01/17/2023    CL 99 01/17/2023    CO2 25 01/17/2023    BUN 11 01/17/2023    CREATININE 0.6 01/17/2023    GLUCOSE 87 01/17/2023    CALCIUM 9.2 01/17/2023    PROT 7.0 01/17/2023    LABALBU 4.3 01/17/2023    BILITOT 0.3 01/17/2023    ALKPHOS 116 01/17/2023    AST 17 01/17/2023    ALT 14 01/17/2023    LABGLOM >60 01/17/2023    GFRAA >60 05/24/2022    AGRATIO 1.6 01/17/2023    GLOB 2.7 10/20/2021     Lab Results   Component Value Date    CHOL 190 05/24/2022    CHOL 241 (H) 10/20/2021    CHOL 240 (H) 03/31/2021     Lab Results   Component Value Date    TRIG 154 (H) 05/24/2022    TRIG 152 (H) 10/20/2021    TRIG 154 (H) 03/31/2021     Lab Results   Component Value Date    HDL 59 05/24/2022    HDL 50 10/20/2021    HDL 52 03/31/2021     Lab Results   Component Value Date    LDLCALC 100 (H) 05/24/2022    LDLCALC 161 (H) 10/20/2021    LDLCALC 157 (H) 03/31/2021     Lab Results   Component Value Date    LABVLDL 31 05/24/2022    LABVLDL 30 10/20/2021    LABVLDL 31 03/31/2021     Lab Results   Component Value Date    LABA1C 5.4 05/24/2022       Discussed use, benefit, and side effects of prescribed medications. Barriers to medication compliance addressed. All patient questions answered. Pt voiced understanding. RTC Return in about 4 weeks (around 9/19/2023).     Future Appointments   Date Time Provider [address]   8/22/2023  1:30 PM MD Sherry Kwan MD  8/22/2023  1:27 PM

## 2023-08-23 LAB
ALBUMIN SERPL-MCNC: 4.6 G/DL (ref 3.4–5)
ALBUMIN/GLOB SERPL: 1.6 {RATIO} (ref 1.1–2.2)
ALP SERPL-CCNC: 85 U/L (ref 40–129)
ALT SERPL-CCNC: 10 U/L (ref 10–40)
ANION GAP SERPL CALCULATED.3IONS-SCNC: 14 MMOL/L (ref 3–16)
AST SERPL-CCNC: 15 U/L (ref 15–37)
BILIRUB SERPL-MCNC: 0.3 MG/DL (ref 0–1)
BUN SERPL-MCNC: 14 MG/DL (ref 7–20)
CALCIUM SERPL-MCNC: 9.6 MG/DL (ref 8.3–10.6)
CHLORIDE SERPL-SCNC: 99 MMOL/L (ref 99–110)
CO2 SERPL-SCNC: 26 MMOL/L (ref 21–32)
CREAT SERPL-MCNC: 0.6 MG/DL (ref 0.6–1.1)
GFR SERPLBLD CREATININE-BSD FMLA CKD-EPI: >60 ML/MIN/{1.73_M2}
GLUCOSE SERPL-MCNC: 82 MG/DL (ref 70–99)
POTASSIUM SERPL-SCNC: 3.4 MMOL/L (ref 3.5–5.1)
PROT SERPL-MCNC: 7.4 G/DL (ref 6.4–8.2)
SODIUM SERPL-SCNC: 139 MMOL/L (ref 136–145)

## 2023-08-24 DIAGNOSIS — E66.9 OBESITY (BMI 30-39.9): ICD-10-CM

## 2023-08-24 RX ORDER — PHENTERMINE HYDROCHLORIDE 37.5 MG/1
37.5 TABLET ORAL
Qty: 30 TABLET | Refills: 0 | Status: SHIPPED | OUTPATIENT
Start: 2023-08-24 | End: 2023-09-23

## 2023-08-24 NOTE — TELEPHONE ENCOUNTER
Medication:   Requested Prescriptions     Pending Prescriptions Disp Refills    phentermine (ADIPEX-P) 37.5 MG tablet 30 tablet 0     Sig: Take 1 tablet by mouth every morning (before breakfast) for 30 days. Max Daily Amount: 37.5 mg        Last Filled:  sent to wrong pharmacy    Patient Phone Number: [phone] (home)     Last appt: 8/22/2023   Next appt: 9/26/2023    Last OARRS: No flowsheet data found.

## 2023-08-24 NOTE — TELEPHONE ENCOUNTER
----- Message from Merced Boland sent at 8/23/2023  5:44 PM EDT -----  Regarding: phentermine  Contact: [phone]  Sorry to bother  you but i got a message  from Jasmyn  that my phentermine was sent to them [facility]  [address] please

## 2023-09-26 ENCOUNTER — OFFICE VISIT (OUTPATIENT)
Dept: PRIMARY CARE CLINIC | Age: 48
End: 2023-09-26
Payer: COMMERCIAL

## 2023-09-26 VITALS
DIASTOLIC BLOOD PRESSURE: 81 MMHG | HEART RATE: 83 BPM | BODY MASS INDEX: 37.54 KG/M2 | HEIGHT: 62 IN | SYSTOLIC BLOOD PRESSURE: 130 MMHG | WEIGHT: 204 LBS | TEMPERATURE: 97.6 F | OXYGEN SATURATION: 98 %

## 2023-09-26 DIAGNOSIS — Z12.31 ENCOUNTER FOR SCREENING MAMMOGRAM FOR MALIGNANT NEOPLASM OF BREAST: ICD-10-CM

## 2023-09-26 DIAGNOSIS — E78.2 MIXED HYPERLIPIDEMIA: ICD-10-CM

## 2023-09-26 DIAGNOSIS — E66.9 OBESITY (BMI 30-39.9): ICD-10-CM

## 2023-09-26 DIAGNOSIS — E78.2 MIXED HYPERLIPIDEMIA: Primary | ICD-10-CM

## 2023-09-26 DIAGNOSIS — J30.2 SEASONAL ALLERGIES: ICD-10-CM

## 2023-09-26 PROCEDURE — 99214 OFFICE O/P EST MOD 30 MIN: CPT | Performed by: STUDENT IN AN ORGANIZED HEALTH CARE EDUCATION/TRAINING PROGRAM

## 2023-09-26 PROCEDURE — 3075F SYST BP GE 130 - 139MM HG: CPT | Performed by: STUDENT IN AN ORGANIZED HEALTH CARE EDUCATION/TRAINING PROGRAM

## 2023-09-26 PROCEDURE — 3079F DIAST BP 80-89 MM HG: CPT | Performed by: STUDENT IN AN ORGANIZED HEALTH CARE EDUCATION/TRAINING PROGRAM

## 2023-09-26 RX ORDER — CETIRIZINE HYDROCHLORIDE 10 MG/1
10 TABLET ORAL DAILY
Qty: 90 TABLET | Refills: 3 | Status: SHIPPED | OUTPATIENT
Start: 2023-09-26 | End: 2024-09-20

## 2023-09-26 RX ORDER — PHENTERMINE HYDROCHLORIDE 37.5 MG/1
37.5 TABLET ORAL
Qty: 30 TABLET | Refills: 0 | Status: CANCELLED | OUTPATIENT
Start: 2023-09-26 | End: 2023-10-26

## 2023-09-26 ASSESSMENT — ENCOUNTER SYMPTOMS
WHEEZING: 0
NAUSEA: 0
SHORTNESS OF BREATH: 0

## 2023-09-26 NOTE — PROGRESS NOTES
[facility]  Establish care visit   2023    Emily Rodriguze (:  1975) is a 51 y.o. female, here to [facility]. Chief Complaint   Patient presents with    [facility]    Weight Management     Follow up        ASSESSMENT/ PLAN  1. Obesity (BMI 30-39. 9)  Uncontrolled, congratulated patient on her weight loss thus far. However, no longer a candidate for Adipex in the setting of lack of weight loss while taking the medication. Referral placed to discuss other medication options per patient request  - Rayo Navarro MD, Bariatric Surgery, (Virtual Visits Only)    2. Seasonal allergies  Controlled, refilled  - cetirizine (ZYRTEC) 10 MG tablet; Take 1 tablet by mouth daily  Dispense: 90 tablet; Refill: 3    3. Mixed hyperlipidemia  The 10-year ASCVD risk score (Mack COX, et al., 2019) is: 1.3%    Values used to calculate the score:      Age: 51 years      Sex: Female      Is Non- : No      Diabetic: No      Tobacco smoker: No      Systolic Blood Pressure: 908 mmHg      Is BP treated: Yes      HDL Cholesterol: 59 mg/dL      Total Cholesterol: 190 mg/dL  Recheck lipid panel, continue Lipitor  - Comprehensive Metabolic Panel; Future  - Hemoglobin A1C; Future  - Lipid Panel; Future    4. Encounter for screening mammogram for malignant neoplasm of breast  - VIK DIGITAL SCREEN W OR WO CAD BILATERAL; Future       Return in about 6 months (around 3/26/2024) for weight check. HPI  Patient presents today to [facility]. She had previously been taking Adipex for weight loss. Notes that she originally lost 50 pounds, but has plateaued in the past 6 months. States that it is extremely difficult for her to drop below 200 pounds despite exercise and calorie restriction. [facility] REBECCA ROLLINS was previously covered for her, but states that the medication was never in stock. Allergies are well controlled with Zyrtec, which she takes daily.     She is also taking Lipitor

## 2023-09-27 LAB
ALBUMIN SERPL-MCNC: 4.4 G/DL (ref 3.4–5)
ALBUMIN/GLOB SERPL: 1.6 {RATIO} (ref 1.1–2.2)
ALP SERPL-CCNC: 92 U/L (ref 40–129)
ALT SERPL-CCNC: 12 U/L (ref 10–40)
ANION GAP SERPL CALCULATED.3IONS-SCNC: 13 MMOL/L (ref 3–16)
AST SERPL-CCNC: 18 U/L (ref 15–37)
BILIRUB SERPL-MCNC: 0.3 MG/DL (ref 0–1)
BUN SERPL-MCNC: 11 MG/DL (ref 7–20)
CALCIUM SERPL-MCNC: 8.6 MG/DL (ref 8.3–10.6)
CHLORIDE SERPL-SCNC: 97 MMOL/L (ref 99–110)
CHOLEST SERPL-MCNC: 244 MG/DL (ref 0–199)
CO2 SERPL-SCNC: 26 MMOL/L (ref 21–32)
CREAT SERPL-MCNC: 0.6 MG/DL (ref 0.6–1.1)
EST. AVERAGE GLUCOSE BLD GHB EST-MCNC: 108.3 MG/DL
GFR SERPLBLD CREATININE-BSD FMLA CKD-EPI: >60 ML/MIN/{1.73_M2}
GLUCOSE SERPL-MCNC: 86 MG/DL (ref 70–99)
HBA1C MFR BLD: 5.4 %
HDLC SERPL-MCNC: 57 MG/DL (ref 40–60)
LDLC SERPL CALC-MCNC: 156 MG/DL
POTASSIUM SERPL-SCNC: 3.7 MMOL/L (ref 3.5–5.1)
PROT SERPL-MCNC: 7.1 G/DL (ref 6.4–8.2)
SODIUM SERPL-SCNC: 136 MMOL/L (ref 136–145)
TRIGL SERPL-MCNC: 155 MG/DL (ref 0–150)
VLDLC SERPL CALC-MCNC: 31 MG/DL

## 2023-10-09 DIAGNOSIS — J45.40 MODERATE PERSISTENT ASTHMA WITHOUT COMPLICATION: ICD-10-CM

## 2023-10-09 NOTE — TELEPHONE ENCOUNTER
Medication:   Requested Prescriptions     Pending Prescriptions Disp Refills    TRELEGY ELLIPTA 100-62.5-25 MCG/ACT AEPB inhaler [Pharmacy Med Name: Dwayne Chaves 100mcg-62. 5mcg-25mcg/actuation Powder for Inhalation]       Sig: Inhale 1 puff by mouth daily.         Last Filled:  08/07/23    Patient Phone Number: [phone] (home)     Last appt: 9/26/2023   Next appt: Visit date not found    Last OARRS:        No data to display

## 2023-10-10 RX ORDER — FLUTICASONE FUROATE, UMECLIDINIUM BROMIDE AND VILANTEROL TRIFENATATE 100; 62.5; 25 UG/1; UG/1; UG/1
POWDER RESPIRATORY (INHALATION)
Qty: 60 EACH | Refills: 5 | Status: SHIPPED | OUTPATIENT
Start: 2023-10-10 | End: 2023-10-11 | Stop reason: SDUPTHER

## 2023-10-11 ENCOUNTER — TELEPHONE (OUTPATIENT)
Dept: PRIMARY CARE CLINIC | Age: 48
End: 2023-10-11

## 2023-10-11 DIAGNOSIS — J45.40 MODERATE PERSISTENT ASTHMA WITHOUT COMPLICATION: ICD-10-CM

## 2023-10-11 RX ORDER — FLUTICASONE FUROATE, UMECLIDINIUM BROMIDE AND VILANTEROL TRIFENATATE 100; 62.5; 25 UG/1; UG/1; UG/1
POWDER RESPIRATORY (INHALATION)
Qty: 180 EACH | Refills: 3 | Status: SHIPPED | OUTPATIENT
Start: 2023-10-11

## 2023-10-11 NOTE — TELEPHONE ENCOUNTER
Delilah Ferrell from the pharmacy called and said that the insurance won't cover a 1 month supply for the medication below that it need to be a 90 day supply.     Cathy Westfall 100-62.5-25 MCG/ACT AEPB inhaler [[id number]]

## 2024-02-13 DIAGNOSIS — I10 HYPERTENSION, UNSPECIFIED TYPE: ICD-10-CM

## 2024-02-13 DIAGNOSIS — I10 ESSENTIAL HYPERTENSION: ICD-10-CM

## 2024-02-13 RX ORDER — HYDROCHLOROTHIAZIDE 12.5 MG/1
12.5 CAPSULE, GELATIN COATED ORAL EVERY MORNING
Qty: 90 CAPSULE | Refills: 2 | Status: SHIPPED | OUTPATIENT
Start: 2024-02-13 | End: 2025-02-07

## 2024-02-13 NOTE — TELEPHONE ENCOUNTER
Medication:   Requested Prescriptions     Pending Prescriptions Disp Refills    hydroCHLOROthiazide 12.5 MG capsule [Pharmacy Med Name: Hydrochlorothiazide 12.5mg Capsule] 90 capsule 2     Sig: Take 1 capsule by mouth every morning.        Last Filled:  3/21/23    Patient Phone Number: [phone] (home)     Last appt: 9/26/2023   Next appt: Visit date not found    Last OARRS:        No data to display

## 2024-02-14 RX ORDER — TRIAMTERENE AND HYDROCHLOROTHIAZIDE 37.5; 25 MG/1; MG/1
1 TABLET ORAL DAILY
Qty: 90 TABLET | Refills: 3 | Status: SHIPPED | OUTPATIENT
Start: 2024-02-14

## 2024-02-14 NOTE — TELEPHONE ENCOUNTER
Medication:   Requested Prescriptions     Pending Prescriptions Disp Refills    triamterene-hydroCHLOROthiazide (MAXZIDE-25) 37.5-25 MG per tablet [Pharmacy Med Name: Triamterene/Hydrochlorothiazide 37.5mg-25mg Tablet] 90 tablet 2     Sig: Take 1 tablet by mouth daily.        Last Filled:  3/21/23    Patient Phone Number: [phone] (home)     Last appt: 9/26/2023   Next appt: Visit date not found    Last OARRS:        No data to display

## 2024-02-15 ENCOUNTER — TELEPHONE (OUTPATIENT)
Dept: PRIMARY CARE CLINIC | Age: 49
End: 2024-02-15

## 2024-02-15 NOTE — TELEPHONE ENCOUNTER
albuterol sulfate HFA (VENTOLIN HFA) 108 (90 Base) MCG/ACT inhaler [[id number]]    Order Details  Dose, Route, Frequency: As Directed   Dispense Quantity: 54 g Refills: 1          Sig: INHALE 2 PUFFS BY MOUTH EVERY 6 HOURS AS NEEDED FOR WHEEZING OR SHORTNESS OF BREATH         Start Date: 05/03/23 End Date: --   Written Date: 05/03/23 Expiration Date: 05/02/24       Associated Diagnoses: Moderate persistent asthma with exacerbation [J45.41]   Original Order: albuterol sulfate HFA (VENTOLIN HFA) 108 (90 Base) MCG/ACT inhaler [[id number]]   Providers    Authorizing Provider: Alanna Mathew MD NPI: [id number]   Ordering User: Alanna Mathew MD          Pharmacy    [facility] Pharmacy Home Delivery - [facility] [address] - P [phone] - F [phone]  [address] [place] [id number]  Phone: [phone]  Fax: [phone]     Order Class:    Order Class   Normal [1]     Destination    This Order   E-PRESCRIBING INTERFACE [[id number]]     Action Summary    Action User: --            Medication Administration Instructions    INHALE 2 PUFFS BY MOUTH EVERY 6 HOURS AS NEEDED FOR WHEEZING OR SHORTNESS OF BREATH     Warnings Override History    No Interaction Warnings Shown      albuterol sulfate HFA (VENTOLIN HFA) 108 (90 Base) MCG/ACT inhaler  Date: 5/3/2023 Department: [facility] Ordering/Authorizing: Alanna Mathew MD     Outpatient Medication Detail     Disp Refills Start End    albuterol sulfate HFA (VENTOLIN HFA) 108 (90 Base) MCG/ACT inhaler 54 g 1 5/3/2023 --    Sig: INHALE 2 PUFFS BY MOUTH EVERY 6 HOURS AS NEEDED FOR WHEEZING OR SHORTNESS OF BREATH    Sent to pharmacy as: Albuterol Sulfate  (90 Base) MCG/ACT Inhalation Aerosol Solution (Ventolin HFA)    E-Prescribing Status: Receipt confirmed by pharmacy (5/3/2023  4:23 PM EDT)    Prior authorization: Payer Waiting for Response

## 2024-02-16 NOTE — TELEPHONE ENCOUNTER
Script for this medication was written by Alanna Mathew; since she has left, I will need a new script written by Dr. Gomez.    Please respond to the pool ( P [facility] MEDICATION PRE-AUTH).      Thank you!

## 2024-02-19 ENCOUNTER — TELEPHONE (OUTPATIENT)
Dept: PRIMARY CARE CLINIC | Age: 49
End: 2024-02-19

## 2024-02-19 DIAGNOSIS — E78.2 MIXED HYPERLIPIDEMIA: ICD-10-CM

## 2024-02-19 DIAGNOSIS — J45.41 MODERATE PERSISTENT ASTHMA WITH EXACERBATION: ICD-10-CM

## 2024-02-19 RX ORDER — ALBUTEROL SULFATE 90 UG/1
AEROSOL, METERED RESPIRATORY (INHALATION)
Qty: 54 G | Refills: 1 | Status: SHIPPED | OUTPATIENT
Start: 2024-02-19 | End: 2024-02-19 | Stop reason: SDUPTHER

## 2024-02-19 RX ORDER — ALBUTEROL SULFATE 90 UG/1
AEROSOL, METERED RESPIRATORY (INHALATION)
Qty: 54 G | Refills: 1 | Status: SHIPPED | OUTPATIENT
Start: 2024-02-19 | End: 2024-02-21 | Stop reason: SDUPTHER

## 2024-02-19 NOTE — TELEPHONE ENCOUNTER
albuterol sulfate HFA (VENTOLIN HFA) 108 (90 Base) MCG/ACT inhaler [[id number]]    Order Details  Dose, Route, Frequency: As Directed   Dispense Quantity: 54 g Refills: 1          Sig: INHALE 2 PUFFS BY MOUTH EVERY 6 HOURS AS NEEDED FOR WHEEZING OR SHORTNESS OF BREATH         Start Date: 02/19/24 End Date: --   Written Date: 02/19/24 Expiration Date: 02/18/25       Associated Diagnoses: Moderate persistent asthma with exacerbation [J45.41]   Original Order: albuterol sulfate HFA (VENTOLIN HFA) 108 (90 Base) MCG/ACT inhaler [[id number]]   Providers    Authorizing Provider: Lisa Gomez DO NPI: [id number]   Ordering User: Lisa Gomez DO          Pharmacy    [facility] - [place], [place] - [address] - P [phone] - F [phone]  [address] [place] [id number]  Phone: [phone]  Fax: [phone]     Order Class:    Order Class   Normal [1]     Destination    This Order   E-PRESCRIBING INTERFACE [[id number]]     Action Summary    Action User: --            Medication Administration Instructions    INHALE 2 PUFFS BY MOUTH EVERY 6 HOURS AS NEEDED FOR WHEEZING OR SHORTNESS OF BREATH     Warnings Override History    No Interaction Warnings Shown      albuterol sulfate HFA (VENTOLIN HFA) 108 (90 Base) MCG/ACT inhaler  Date: 2/19/2024 Department: [facility] Ordering/Authorizing: Lisa Gomez DO     Outpatient Medication Detail     Disp Refills Start End    albuterol sulfate HFA (VENTOLIN HFA) 108 (90 Base) MCG/ACT inhaler 54 g 1 2/19/2024 --    Sig: INHALE 2 PUFFS BY MOUTH EVERY 6 HOURS AS NEEDED FOR WHEEZING OR SHORTNESS OF BREATH    Sent to pharmacy as: Albuterol Sulfate  (90 Base) MCG/ACT Inhalation Aerosol Solution (Ventolin HFA)    E-Prescribing Status: Receipt confirmed by pharmacy (2/19/2024  6:51 AM EST)    Prior authorization: Payer Waiting for Response

## 2024-02-21 DIAGNOSIS — J45.41 MODERATE PERSISTENT ASTHMA WITH EXACERBATION: ICD-10-CM

## 2024-02-21 RX ORDER — ALBUTEROL SULFATE 90 UG/1
AEROSOL, METERED RESPIRATORY (INHALATION)
Qty: 54 G | Refills: 1 | Status: SHIPPED | OUTPATIENT
Start: 2024-02-21

## 2024-03-20 DIAGNOSIS — E78.2 MIXED HYPERLIPIDEMIA: ICD-10-CM

## 2024-03-21 RX ORDER — ATORVASTATIN CALCIUM 20 MG/1
20 TABLET, FILM COATED ORAL DAILY
Qty: 90 TABLET | Refills: 2 | Status: SHIPPED | OUTPATIENT
Start: 2024-03-21

## 2024-03-21 NOTE — TELEPHONE ENCOUNTER
Medication:   Requested Prescriptions     Pending Prescriptions Disp Refills    atorvastatin (LIPITOR) 20 MG tablet [Pharmacy Med Name: Atorvastatin Calcium 20mg Tablet] 90 tablet 2     Sig: Take 1 tablet by mouth daily.        Last Filled:  5/01/23    Patient Phone Number: [phone] (home)     Last appt: 9/26/2023   Next appt: 4/11/2024    Last OARRS:        No data to display

## 2024-08-31 ASSESSMENT — PATIENT HEALTH QUESTIONNAIRE - PHQ9
SUM OF ALL RESPONSES TO PHQ QUESTIONS 1-9: 0
SUM OF ALL RESPONSES TO PHQ QUESTIONS 1-9: 0
1. LITTLE INTEREST OR PLEASURE IN DOING THINGS: NOT AT ALL
2. FEELING DOWN, DEPRESSED OR HOPELESS: NOT AT ALL
SUM OF ALL RESPONSES TO PHQ QUESTIONS 1-9: 0
SUM OF ALL RESPONSES TO PHQ QUESTIONS 1-9: 0
1. LITTLE INTEREST OR PLEASURE IN DOING THINGS: NOT AT ALL
2. FEELING DOWN, DEPRESSED OR HOPELESS: NOT AT ALL
SUM OF ALL RESPONSES TO PHQ9 QUESTIONS 1 & 2: 0
SUM OF ALL RESPONSES TO PHQ9 QUESTIONS 1 & 2: 0

## 2024-09-03 ENCOUNTER — OFFICE VISIT (OUTPATIENT)
Dept: PRIMARY CARE CLINIC | Age: 49
End: 2024-09-03
Payer: COMMERCIAL

## 2024-09-03 VITALS
SYSTOLIC BLOOD PRESSURE: 139 MMHG | TEMPERATURE: 97 F | OXYGEN SATURATION: 98 % | HEART RATE: 87 BPM | WEIGHT: 234 LBS | HEIGHT: 62 IN | BODY MASS INDEX: 43.06 KG/M2 | DIASTOLIC BLOOD PRESSURE: 86 MMHG

## 2024-09-03 DIAGNOSIS — M25.561 ACUTE PAIN OF RIGHT KNEE: ICD-10-CM

## 2024-09-03 DIAGNOSIS — Z00.00 ANNUAL PHYSICAL EXAM: Primary | ICD-10-CM

## 2024-09-03 DIAGNOSIS — Z12.31 ENCOUNTER FOR SCREENING MAMMOGRAM FOR MALIGNANT NEOPLASM OF BREAST: ICD-10-CM

## 2024-09-03 DIAGNOSIS — E66.01 OBESITY, CLASS III, BMI 40-49.9 (MORBID OBESITY) (HCC): ICD-10-CM

## 2024-09-03 PROCEDURE — 3075F SYST BP GE 130 - 139MM HG: CPT | Performed by: STUDENT IN AN ORGANIZED HEALTH CARE EDUCATION/TRAINING PROGRAM

## 2024-09-03 PROCEDURE — 3079F DIAST BP 80-89 MM HG: CPT | Performed by: STUDENT IN AN ORGANIZED HEALTH CARE EDUCATION/TRAINING PROGRAM

## 2024-09-03 PROCEDURE — 99396 PREV VISIT EST AGE 40-64: CPT | Performed by: STUDENT IN AN ORGANIZED HEALTH CARE EDUCATION/TRAINING PROGRAM

## 2024-09-03 SDOH — ECONOMIC STABILITY: INCOME INSECURITY: HOW HARD IS IT FOR YOU TO PAY FOR THE VERY BASICS LIKE FOOD, HOUSING, MEDICAL CARE, AND HEATING?: NOT HARD AT ALL

## 2024-09-03 SDOH — ECONOMIC STABILITY: FOOD INSECURITY: WITHIN THE PAST 12 MONTHS, YOU WORRIED THAT YOUR FOOD WOULD RUN OUT BEFORE YOU GOT MONEY TO BUY MORE.: NEVER TRUE

## 2024-09-03 SDOH — ECONOMIC STABILITY: FOOD INSECURITY: WITHIN THE PAST 12 MONTHS, THE FOOD YOU BOUGHT JUST DIDN'T LAST AND YOU DIDN'T HAVE MONEY TO GET MORE.: NEVER TRUE

## 2024-09-03 ASSESSMENT — ENCOUNTER SYMPTOMS
NAUSEA: 0
SHORTNESS OF BREATH: 0
WHEEZING: 0

## 2024-09-03 NOTE — PROGRESS NOTES
[facility]  9/3/2024    Mell Wayne (:  1975) is a 49 y.o. female, here for evaluation of the following medical concerns:    Chief Complaint   Patient presents with    Annual Exam    Hyperlipidemia     Follow up        ASSESSMENT/ PLAN  1. Annual physical exam  Screening labs ordered today if indicated, recommend 150 minutes of exercise weekly and healthy dietary choices.  Pertinent cancer screening and immunizations recommended/discussed with the patient, and orders placed or deferred per patient consent.    - Comprehensive Metabolic Panel; Future  - Hemoglobin A1C; Future  - Lipid Panel; Future    2. Acute pain of right knee  Uncontrolled, this is a new problem.  Differential includes bursitis, arthritis flare, cannot rule out gout.  Check labs today as well as x-ray and treat pending results  - CBC with Auto Differential; Future  - Uric Acid; Future  - XR KNEE RIGHT (3 VIEWS); Future    3. Obesity, Class III, BMI 40-49.9 (morbid obesity) (HCC)  Complicates all aspects of patient's care    4. Encounter for screening mammogram for malignant neoplasm of breast  - Fecal DNA Colorectal cancer screening (Cologuard)       Return in about 6 months (around 3/3/2025) for blood pressure follow-up, asthma.    HPI  Patient presents today for annual physical exam with concerns of right knee pain.    States that she has had knee pain and swelling for the past 3 weeks.  She describes it as a burning sensation.  She does have a history of a Baker's cyst, but this feels different.  No history of gout.  No new injuries.  The pain has precluded her from her regular walking and pickleball exercise.  She has taken Advil with minor improvement.  No fever, chills.    Blood pressure remains well-controlled on Maxide, hydrochlorothiazide which she takes daily as prescribed.  She tolerates well without side effects.    She is working part-time at [employer] and full-time at [employer].  Due for updated Cologuard in

## 2024-09-04 DIAGNOSIS — J30.2 SEASONAL ALLERGIES: ICD-10-CM

## 2024-09-04 DIAGNOSIS — Z00.00 ANNUAL PHYSICAL EXAM: ICD-10-CM

## 2024-09-04 DIAGNOSIS — M25.561 ACUTE PAIN OF RIGHT KNEE: ICD-10-CM

## 2024-09-04 RX ORDER — CETIRIZINE HYDROCHLORIDE 10 MG/1
10 TABLET ORAL DAILY
Qty: 90 TABLET | Refills: 3 | Status: SHIPPED | OUTPATIENT
Start: 2024-09-04 | End: 2025-08-30

## 2024-09-04 NOTE — TELEPHONE ENCOUNTER
Medication:   Requested Prescriptions     Pending Prescriptions Disp Refills    cetirizine (ZYRTEC) 10 MG tablet [Pharmacy Med Name: Cetirizine Hydrochloride 10mg 24 Hour Allergy Relief Tablet] 90 tablet 2     Sig: Take 1 tablet by mouth daily.        Last Filled:  9/26/23    Patient Phone Number: [phone] (home)     Last appt: 9/3/2024   Next appt: Visit date not found    Last OARRS:        No data to display

## 2024-09-05 LAB
ALBUMIN SERPL-MCNC: 4.3 G/DL (ref 3.4–5)
ALBUMIN/GLOB SERPL: 1.5 {RATIO} (ref 1.1–2.2)
ALP SERPL-CCNC: 112 U/L (ref 40–129)
ALT SERPL-CCNC: 15 U/L (ref 10–40)
ANION GAP SERPL CALCULATED.3IONS-SCNC: 13 MMOL/L (ref 3–16)
AST SERPL-CCNC: 20 U/L (ref 15–37)
BASOPHILS # BLD: 0.1 K/UL (ref 0–0.2)
BASOPHILS NFR BLD: 1.1 %
BILIRUB SERPL-MCNC: 0.3 MG/DL (ref 0–1)
BUN SERPL-MCNC: 13 MG/DL (ref 7–20)
CALCIUM SERPL-MCNC: 9.8 MG/DL (ref 8.3–10.6)
CHLORIDE SERPL-SCNC: 100 MMOL/L (ref 99–110)
CHOLEST SERPL-MCNC: 218 MG/DL (ref 0–199)
CO2 SERPL-SCNC: 25 MMOL/L (ref 21–32)
CREAT SERPL-MCNC: 0.7 MG/DL (ref 0.6–1.1)
DEPRECATED RDW RBC AUTO: 15.4 % (ref 12.4–15.4)
EOSINOPHIL # BLD: 0.2 K/UL (ref 0–0.6)
EOSINOPHIL NFR BLD: 1.9 %
EST. AVERAGE GLUCOSE BLD GHB EST-MCNC: 108.3 MG/DL
GFR SERPLBLD CREATININE-BSD FMLA CKD-EPI: >90 ML/MIN/{1.73_M2}
GLUCOSE SERPL-MCNC: 82 MG/DL (ref 70–99)
HBA1C MFR BLD: 5.4 %
HCT VFR BLD AUTO: 41.1 % (ref 36–48)
HDLC SERPL-MCNC: 55 MG/DL (ref 40–60)
HGB BLD-MCNC: 13.1 G/DL (ref 12–16)
LDLC SERPL CALC-MCNC: 124 MG/DL
LYMPHOCYTES # BLD: 2.2 K/UL (ref 1–5.1)
LYMPHOCYTES NFR BLD: 23.1 %
MCH RBC QN AUTO: 27.5 PG (ref 26–34)
MCHC RBC AUTO-ENTMCNC: 31.8 G/DL (ref 31–36)
MCV RBC AUTO: 86.3 FL (ref 80–100)
MONOCYTES # BLD: 0.7 K/UL (ref 0–1.3)
MONOCYTES NFR BLD: 6.9 %
NEUTROPHILS # BLD: 6.4 K/UL (ref 1.7–7.7)
NEUTROPHILS NFR BLD: 67 %
PLATELET # BLD AUTO: 284 K/UL (ref 135–450)
PMV BLD AUTO: 9.5 FL (ref 5–10.5)
POTASSIUM SERPL-SCNC: 3.9 MMOL/L (ref 3.5–5.1)
PROT SERPL-MCNC: 7.1 G/DL (ref 6.4–8.2)
RBC # BLD AUTO: 4.76 M/UL (ref 4–5.2)
SODIUM SERPL-SCNC: 138 MMOL/L (ref 136–145)
TRIGL SERPL-MCNC: 197 MG/DL (ref 0–150)
URATE SERPL-MCNC: 5.5 MG/DL (ref 2.6–6)
VLDLC SERPL CALC-MCNC: 39 MG/DL
WBC # BLD AUTO: 9.6 K/UL (ref 4–11)

## 2024-09-19 DIAGNOSIS — Z12.11 SCREENING FOR COLON CANCER: Primary | ICD-10-CM

## 2024-11-02 DIAGNOSIS — J45.40 MODERATE PERSISTENT ASTHMA WITHOUT COMPLICATION: ICD-10-CM

## 2024-11-03 DIAGNOSIS — I10 HYPERTENSION, UNSPECIFIED TYPE: ICD-10-CM

## 2024-11-03 DIAGNOSIS — J45.40 MODERATE PERSISTENT ASTHMA WITHOUT COMPLICATION: ICD-10-CM

## 2024-11-04 NOTE — TELEPHONE ENCOUNTER
Blood pressure 102/64, pulse 56, resp. rate 18, height 4' 11\" (1.499 m), weight 178 lb (80.7 kg).      Patient presents today with mother reporting he complains of gas.  He has 1 or 2 bowel movements a day denies any blood in the stool.  No black or tarry stools.  No vomiting.  No diarrhea.  Mother reports she is losing weight deliberately stopped drinking soft drinks and he stopped eating cheese.    Objective patient comfortable no apparent distress    Assessment gas and bloating possible constipation    Plan KUB x-ray also ordered fasting blood test with urine    Will follow with results   Medication:   Requested Prescriptions     Pending Prescriptions Disp Refills    fluticasone-umeclidin-vilant (TRELEGY ELLIPTA) 100-62.5-25 MCG/ACT AEPB inhaler 180 each 3     Sig: Inhale 1 puff by mouth daily.        Last Filled:  10/11/2023    Patient Phone Number: [phone] (home)     Last appt: 9/3/2024   Next appt: 11/3/2024    Last OARRS:        No data to display

## 2024-11-04 NOTE — TELEPHONE ENCOUNTER
Medication:   Requested Prescriptions     Pending Prescriptions Disp Refills    fluticasone-umeclidin-vilant (TRELEGY ELLIPTA) 100-62.5-25 MCG/ACT AEPB inhaler [Pharmacy Med Name: TRELEGY ELLIPTA 100mcg-62.5mcg-25mcg/actuation Powder for Inhalation] 180 each 2     Sig: Inhale 1 puff by mouth daily.        Last Filled:  10/11/2023    Patient Phone Number: [phone] (home)     Last appt: 9/3/2024   Next appt: 11/3/2024    Last OARRS:        No data to display

## 2024-11-04 NOTE — TELEPHONE ENCOUNTER
Medication:   Requested Prescriptions     Pending Prescriptions Disp Refills    hydroCHLOROthiazide 12.5 MG capsule [Pharmacy Med Name: Hydrochlorothiazide 12.5mg Capsule] 90 capsule 1     Sig: Take 1 capsule by mouth every morning        Last Filled:  2/13/2024    Patient Phone Number: [phone] (home)     Last appt: 9/3/2024 Return in about 6 months (around 3/3/2025) for blood pressure follow-up, asthma.  Next appt: Visit date not found    Last OARRS:        No data to display

## 2024-11-05 RX ORDER — HYDROCHLOROTHIAZIDE 12.5 MG/1
12.5 CAPSULE ORAL EVERY MORNING
Qty: 90 CAPSULE | Refills: 1 | Status: SHIPPED | OUTPATIENT
Start: 2024-11-05 | End: 2025-05-04

## 2024-11-05 RX ORDER — FLUTICASONE FUROATE, UMECLIDINIUM BROMIDE AND VILANTEROL TRIFENATATE 100; 62.5; 25 UG/1; UG/1; UG/1
POWDER RESPIRATORY (INHALATION)
Qty: 180 EACH | Refills: 3 | Status: SHIPPED | OUTPATIENT
Start: 2024-11-05

## 2024-11-05 RX ORDER — FLUTICASONE FUROATE, UMECLIDINIUM BROMIDE AND VILANTEROL TRIFENATATE 100; 62.5; 25 UG/1; UG/1; UG/1
POWDER RESPIRATORY (INHALATION)
Qty: 180 EACH | Refills: 2 | Status: SHIPPED | OUTPATIENT
Start: 2024-11-05

## 2024-12-24 LAB — NONINV COLON CA DNA+OCC BLD SCRN STL QL: NEGATIVE

## 2024-12-27 DIAGNOSIS — I10 HYPERTENSION, UNSPECIFIED TYPE: ICD-10-CM

## 2024-12-27 DIAGNOSIS — I10 ESSENTIAL HYPERTENSION: ICD-10-CM

## 2024-12-27 RX ORDER — HYDROCHLOROTHIAZIDE 12.5 MG/1
12.5 CAPSULE ORAL EVERY MORNING
Qty: 90 CAPSULE | Refills: 1 | Status: SHIPPED | OUTPATIENT
Start: 2024-12-27 | End: 2025-06-25

## 2024-12-27 NOTE — TELEPHONE ENCOUNTER
Medication:   Requested Prescriptions     Pending Prescriptions Disp Refills    hydroCHLOROthiazide 12.5 MG capsule 90 capsule 1     Sig: Take 1 capsule by mouth every morning        Last Filled:  11/05/2024     Patient Phone Number: [phone] (home)     Last appt: 9/3/2024   Next appt: Visit date not found               Return in about 6 months (around 3/3/2025) for blood pressure follow-up, asthma.

## 2024-12-30 RX ORDER — HYDROCHLOROTHIAZIDE 12.5 MG/1
12.5 CAPSULE ORAL EVERY MORNING
Qty: 90 CAPSULE | Refills: 1 | OUTPATIENT
Start: 2024-12-30 | End: 2025-06-28

## 2024-12-31 RX ORDER — HYDROCHLOROTHIAZIDE 12.5 MG/1
12.5 CAPSULE ORAL EVERY MORNING
Qty: 90 CAPSULE | Refills: 1 | Status: SHIPPED | OUTPATIENT
Start: 2024-12-31 | End: 2025-06-29

## 2024-12-31 RX ORDER — TRIAMTERENE AND HYDROCHLOROTHIAZIDE 37.5; 25 MG/1; MG/1
1 TABLET ORAL DAILY
Qty: 90 TABLET | Refills: 3 | Status: SHIPPED | OUTPATIENT
Start: 2024-12-31

## 2025-03-01 DIAGNOSIS — E78.2 MIXED HYPERLIPIDEMIA: ICD-10-CM

## 2025-03-03 RX ORDER — ATORVASTATIN CALCIUM 20 MG/1
20 TABLET, FILM COATED ORAL DAILY
Qty: 90 TABLET | Refills: 1 | Status: SHIPPED | OUTPATIENT
Start: 2025-03-03

## 2025-03-03 NOTE — TELEPHONE ENCOUNTER
Medication:   Requested Prescriptions     Pending Prescriptions Disp Refills    atorvastatin (LIPITOR) 20 MG tablet [Pharmacy Med Name: Atorvastatin Calcium 20mg Tablet] 90 tablet 1     Sig: Take 1 tablet by mouth daily.        Last Filled:  3/21/2024    Patient Phone Number: [phone] (home)     Last appt: 9/3/2024   Next appt: Visit date not found        Return in about 6 months (around 3/3/2025) for blood pressure follow-up, asthma.

## 2025-05-05 ENCOUNTER — OFFICE VISIT (OUTPATIENT)
Dept: PRIMARY CARE CLINIC | Age: 50
End: 2025-05-05
Payer: COMMERCIAL

## 2025-05-05 ENCOUNTER — RESULTS FOLLOW-UP (OUTPATIENT)
Dept: PRIMARY CARE CLINIC | Age: 50
End: 2025-05-05

## 2025-05-05 ENCOUNTER — HOSPITAL ENCOUNTER (OUTPATIENT)
Dept: CT IMAGING | Age: 50
Discharge: HOME | End: 2025-05-05
Attending: STUDENT IN AN ORGANIZED HEALTH CARE EDUCATION/TRAINING PROGRAM
Payer: COMMERCIAL

## 2025-05-05 VITALS
HEART RATE: 86 BPM | WEIGHT: 237 LBS | OXYGEN SATURATION: 98 % | TEMPERATURE: 97.4 F | BODY MASS INDEX: 43.61 KG/M2 | HEIGHT: 62 IN | SYSTOLIC BLOOD PRESSURE: 175 MMHG | DIASTOLIC BLOOD PRESSURE: 86 MMHG

## 2025-05-05 DIAGNOSIS — R10.9 LEFT FLANK PAIN: Primary | ICD-10-CM

## 2025-05-05 DIAGNOSIS — N30.01 ACUTE CYSTITIS WITH HEMATURIA: Primary | ICD-10-CM

## 2025-05-05 DIAGNOSIS — R10.9 LEFT FLANK PAIN: ICD-10-CM

## 2025-05-05 DIAGNOSIS — M54.50 LOW BACK PAIN, UNSPECIFIED BACK PAIN LATERALITY, UNSPECIFIED CHRONICITY, UNSPECIFIED WHETHER SCIATICA PRESENT: ICD-10-CM

## 2025-05-05 LAB
BILIRUBIN, POC: NEGATIVE
BLOOD URINE, POC: ABNORMAL
CLARITY, POC: ABNORMAL
COLOR, POC: YELLOW
GLUCOSE URINE, POC: NEGATIVE MG/DL
KETONES, POC: NEGATIVE MG/DL
LEUKOCYTE EST, POC: NEGATIVE
NITRITE, POC: NEGATIVE
PH, POC: 6
PROTEIN, POC: NEGATIVE MG/DL
SPECIFIC GRAVITY, POC: 1.01
UROBILINOGEN, POC: 0.2 MG/DL

## 2025-05-05 PROCEDURE — 81002 URINALYSIS NONAUTO W/O SCOPE: CPT | Performed by: STUDENT IN AN ORGANIZED HEALTH CARE EDUCATION/TRAINING PROGRAM

## 2025-05-05 PROCEDURE — 74176 CT ABD & PELVIS W/O CONTRAST: CPT

## 2025-05-05 PROCEDURE — 3077F SYST BP >= 140 MM HG: CPT | Performed by: STUDENT IN AN ORGANIZED HEALTH CARE EDUCATION/TRAINING PROGRAM

## 2025-05-05 PROCEDURE — 3079F DIAST BP 80-89 MM HG: CPT | Performed by: STUDENT IN AN ORGANIZED HEALTH CARE EDUCATION/TRAINING PROGRAM

## 2025-05-05 PROCEDURE — G2211 COMPLEX E/M VISIT ADD ON: HCPCS | Performed by: STUDENT IN AN ORGANIZED HEALTH CARE EDUCATION/TRAINING PROGRAM

## 2025-05-05 PROCEDURE — 99214 OFFICE O/P EST MOD 30 MIN: CPT | Performed by: STUDENT IN AN ORGANIZED HEALTH CARE EDUCATION/TRAINING PROGRAM

## 2025-05-05 RX ORDER — CIPROFLOXACIN 500 MG/1
500 TABLET, FILM COATED ORAL 2 TIMES DAILY
Qty: 14 TABLET | Refills: 0 | Status: SHIPPED | OUTPATIENT
Start: 2025-05-05 | End: 2025-05-12

## 2025-05-05 SDOH — ECONOMIC STABILITY: FOOD INSECURITY: WITHIN THE PAST 12 MONTHS, THE FOOD YOU BOUGHT JUST DIDN'T LAST AND YOU DIDN'T HAVE MONEY TO GET MORE.: SOMETIMES TRUE

## 2025-05-05 SDOH — ECONOMIC STABILITY: FOOD INSECURITY: WITHIN THE PAST 12 MONTHS, YOU WORRIED THAT YOUR FOOD WOULD RUN OUT BEFORE YOU GOT MONEY TO BUY MORE.: SOMETIMES TRUE

## 2025-05-05 SDOH — ECONOMIC STABILITY: INCOME INSECURITY: IN THE LAST 12 MONTHS, WAS THERE A TIME WHEN YOU WERE NOT ABLE TO PAY THE MORTGAGE OR RENT ON TIME?: YES

## 2025-05-05 ASSESSMENT — PATIENT HEALTH QUESTIONNAIRE - PHQ9
2. FEELING DOWN, DEPRESSED OR HOPELESS: NOT AT ALL
SUM OF ALL RESPONSES TO PHQ QUESTIONS 1-9: 0
1. LITTLE INTEREST OR PLEASURE IN DOING THINGS: NOT AT ALL
2. FEELING DOWN, DEPRESSED OR HOPELESS: NOT AT ALL
SUM OF ALL RESPONSES TO PHQ9 QUESTIONS 1 & 2: 0
SUM OF ALL RESPONSES TO PHQ QUESTIONS 1-9: 0
1. LITTLE INTEREST OR PLEASURE IN DOING THINGS: NOT AT ALL

## 2025-05-05 ASSESSMENT — ENCOUNTER SYMPTOMS
NAUSEA: 0
ABDOMINAL PAIN: 0

## 2025-05-05 NOTE — PROGRESS NOTES
[facility]  2025    Mell Wayne (:  1975) is a 50 y.o. female, here for evaluation of the following medical concerns:    Chief Complaint   Patient presents with    Back Pain     Has sciatica, but this lower back pain has not had the shooting pain like she is used to.         ASSESSMENT/ PLAN  1. Left flank pain  - UA demonstrating hematuria, left CVA pain on exam with no other back pain, states different than previous episodes of sciatica. Also states feels a \"relief\" from the pressure when urinating, however denies any other urinary symptoms. Will order CT STAT to assess for kidney stone. Further management pending results. If no stone, will treat for possible ascending UTI with early pyelonephritis. Discussed with patient who expressed understanding and return precautions discussed  - CT ABDOMEN PELVIS WO CONTRAST Additional Contrast? None; Future    2. Low back pain, unspecified back pain laterality, unspecified chronicity, unspecified whether sciatica present  - Plan as above   - POCT Urinalysis no Micro  - CT ABDOMEN PELVIS WO CONTRAST Additional Contrast? None; Future       No follow-ups on file.    HPI  Here for acute concerns.    Has been having back pain since yesterday. Has a history of sciatica but states this feels much different. Stretches/exercises not stimulating the pain like typical. Denies any hematuria, dysuria. Possible increased urinary frequency. Not radiating down the leg. Urinating provides some relief from the pressure, constant pressure in left flank area.      ROS  Review of Systems   Constitutional:  Negative for fever.   Gastrointestinal:  Negative for abdominal pain, nausea and vomiting.   Genitourinary:  Positive for flank pain and frequency. Negative for hematuria.       HISTORIES  Current Outpatient Medications on File Prior to Visit   Medication Sig Dispense Refill    atorvastatin (LIPITOR) 20 MG tablet Take 1 tablet by mouth daily. 90 tablet 1

## 2025-05-06 ENCOUNTER — TELEPHONE (OUTPATIENT)
Dept: PRIMARY CARE CLINIC | Age: 50
End: 2025-05-06

## 2025-05-06 NOTE — TELEPHONE ENCOUNTER
Pt saw Dr. Gleason yesterday. A stat  CT was ordered. Insurance told her they will not cover it unless Victorino Jimenez calls them [phone] and asks for retro active pre authorization. She would like to know if he can do this .

## 2025-05-07 NOTE — TELEPHONE ENCOUNTER
Called to initiate PA/ Peer to Peer for retro CT. Was transferred to Debra with [employer] for [employer] authorizations. Unable to do STAT RETRO PA, Said STAT PA is only for same day, would need to do a regular Retro PA which could take up to 30 days to process.   Said to fax clinical notes to [phone], reference #[id number].     Also said could initiate / send clinical notes through [email or web address]      Faxed office note and POCT urinalysis results from 5/5/25 to number provided. Scanned in fax success confirmation.    Waiting for a response.     Received response to fax to [facility]. Re-faxed Clinical information and Order to [facility] and scanned in success.

## 2025-05-27 ENCOUNTER — OFFICE VISIT (OUTPATIENT)
Dept: PRIMARY CARE CLINIC | Age: 50
End: 2025-05-27
Payer: COMMERCIAL

## 2025-05-27 VITALS
DIASTOLIC BLOOD PRESSURE: 67 MMHG | BODY MASS INDEX: 43.69 KG/M2 | WEIGHT: 237.4 LBS | SYSTOLIC BLOOD PRESSURE: 126 MMHG | HEART RATE: 88 BPM | HEIGHT: 62 IN | TEMPERATURE: 97.8 F

## 2025-05-27 DIAGNOSIS — Z11.59 ENCOUNTER FOR HEPATITIS C SCREENING TEST FOR LOW RISK PATIENT: ICD-10-CM

## 2025-05-27 DIAGNOSIS — Z11.4 ENCOUNTER FOR SCREENING FOR HUMAN IMMUNODEFICIENCY VIRUS (HIV): ICD-10-CM

## 2025-05-27 DIAGNOSIS — G89.29 CHRONIC BILATERAL LOW BACK PAIN WITHOUT SCIATICA: Primary | ICD-10-CM

## 2025-05-27 DIAGNOSIS — Z12.31 ENCOUNTER FOR SCREENING MAMMOGRAM FOR MALIGNANT NEOPLASM OF BREAST: ICD-10-CM

## 2025-05-27 DIAGNOSIS — M54.50 CHRONIC BILATERAL LOW BACK PAIN WITHOUT SCIATICA: Primary | ICD-10-CM

## 2025-05-27 PROCEDURE — 99214 OFFICE O/P EST MOD 30 MIN: CPT | Performed by: STUDENT IN AN ORGANIZED HEALTH CARE EDUCATION/TRAINING PROGRAM

## 2025-05-27 PROCEDURE — 3074F SYST BP LT 130 MM HG: CPT | Performed by: STUDENT IN AN ORGANIZED HEALTH CARE EDUCATION/TRAINING PROGRAM

## 2025-05-27 PROCEDURE — 3078F DIAST BP <80 MM HG: CPT | Performed by: STUDENT IN AN ORGANIZED HEALTH CARE EDUCATION/TRAINING PROGRAM

## 2025-05-27 RX ORDER — CYCLOBENZAPRINE HCL 10 MG
10 TABLET ORAL NIGHTLY PRN
Qty: 10 TABLET | Refills: 0 | Status: SHIPPED | OUTPATIENT
Start: 2025-05-27 | End: 2025-06-06

## 2025-05-27 RX ORDER — PREDNISONE 10 MG/1
TABLET ORAL
Qty: 30 TABLET | Refills: 0 | Status: SHIPPED | OUTPATIENT
Start: 2025-05-27 | End: 2025-06-08

## 2025-05-27 ASSESSMENT — ENCOUNTER SYMPTOMS
WHEEZING: 0
SHORTNESS OF BREATH: 0
NAUSEA: 0
BACK PAIN: 1

## 2025-05-27 NOTE — PROGRESS NOTES
[facility]  2025    Mell Wayne (:  1975) is a 50 y.o. female, here for evaluation of the following medical concerns:    Chief Complaint   Patient presents with    Back Pain     Saw  for this 2025, ua done and cat scan still having issues.         ASSESSMENT/ PLAN  1. Chronic bilateral low back pain without sciatica  Uncontrolled, this is a new problem.  Initiate prednisone, Flexeril for pain relief and referral placed for physical therapy.  Consider MRI for lumbar stenosis if no significant improvement at follow-up appointment  - predniSONE (DELTASONE) 10 MG tablet; Take 4 tablets by mouth daily for 3 days, THEN 3 tablets daily for 3 days, THEN 2 tablets daily for 3 days, THEN 1 tablet daily for 3 days.  Dispense: 30 tablet; Refill: 0  - cyclobenzaprine (FLEXERIL) 10 MG tablet; Take 1 tablet by mouth nightly as needed for Muscle spasms  Dispense: 10 tablet; Refill: 0  - [facility]OSR    2. Encounter for screening mammogram for malignant neoplasm of breast  - VIK DIGITAL SCREEN W OR WO CAD BILATERAL; Future    3. Encounter for screening for human immunodeficiency virus (HIV)  - HIV Screen; Future    4. Encounter for hepatitis C screening test for low risk patient  - Hepatitis C Antibody; Future     >30 minutes spent on chart review, care coordination and patient counseling regarding disease state, lifestyle modifications and/or health maintenance screening.     Return in about 2 weeks (around 6/10/2025) for low back pain.    HPI  Patient presents today for follow-up on low back pain.    At previous appointment, back pain related to kidney stone/pyelonephritis was ruled out.  Today, she notes that it is bilateral, worse with standing and leaning backwards.  No lower extremity radiation, numbness or tingling.  She does have a history of sciatica, but this feels different.  No injury to the area, saddle anesthesia, bowel or bladder

## 2025-06-10 ENCOUNTER — OFFICE VISIT (OUTPATIENT)
Dept: PRIMARY CARE CLINIC | Age: 50
End: 2025-06-10

## 2025-06-10 VITALS
HEIGHT: 62 IN | SYSTOLIC BLOOD PRESSURE: 137 MMHG | HEART RATE: 93 BPM | TEMPERATURE: 98 F | DIASTOLIC BLOOD PRESSURE: 87 MMHG | WEIGHT: 234.6 LBS | BODY MASS INDEX: 43.17 KG/M2

## 2025-06-10 DIAGNOSIS — E78.2 MIXED HYPERLIPIDEMIA: Primary | ICD-10-CM

## 2025-06-10 DIAGNOSIS — J45.41 MODERATE PERSISTENT ASTHMA WITH EXACERBATION: ICD-10-CM

## 2025-06-10 DIAGNOSIS — M54.50 CHRONIC BILATERAL LOW BACK PAIN WITHOUT SCIATICA: ICD-10-CM

## 2025-06-10 DIAGNOSIS — G89.29 CHRONIC BILATERAL LOW BACK PAIN WITHOUT SCIATICA: ICD-10-CM

## 2025-06-10 RX ORDER — MAGNESIUM 30 MG
30 TABLET ORAL 2 TIMES DAILY
COMMUNITY
End: 2025-06-10 | Stop reason: ALTCHOICE

## 2025-06-10 RX ORDER — ATORVASTATIN CALCIUM 20 MG/1
20 TABLET, FILM COATED ORAL DAILY
Qty: 90 TABLET | Refills: 3 | Status: SHIPPED | OUTPATIENT
Start: 2025-06-10

## 2025-06-10 RX ORDER — ALBUTEROL SULFATE 90 UG/1
AEROSOL, METERED RESPIRATORY (INHALATION)
Qty: 54 G | Refills: 1 | Status: SHIPPED | OUTPATIENT
Start: 2025-06-10

## 2025-06-10 ASSESSMENT — ENCOUNTER SYMPTOMS
NAUSEA: 0
SHORTNESS OF BREATH: 0
BACK PAIN: 1
WHEEZING: 0

## 2025-06-10 NOTE — PROGRESS NOTES
[facility]  6/10/2025    Mell Wayne (:  1975) is a 50 y.o. female, here for evaluation of the following medical concerns:    Chief Complaint   Patient presents with    2 Week Follow-Up     Lower back pain, doing better when sleeping on the couch.           ASSESSMENT/ PLAN  1. Mixed hyperlipidemia  Continue Lipitor and update cholesterol panel at follow-up appointment  - atorvastatin (LIPITOR) 20 MG tablet; Take 1 tablet by mouth daily  Dispense: 90 tablet; Refill: 3    2. Moderate persistent asthma with exacerbation  Controlled, refilled  - VENTOLIN  (90 Base) MCG/ACT inhaler; INHALE 2 PUFFS BY MOUTH EVERY 6 HOURS AS NEEDED FOR WHEEZING OR SHORTNESS OF BREATH  Dispense: 54 g; Refill: 1    3. Chronic bilateral low back pain without sciatica  Uncontrolled, although improved from previous.  Declines referral for assessment of epidural injections or additional imaging at this time.  Continue physical therapy and anti-inflammatory medication.       Return in about 3 months (around 9/10/2025) for annual physical, back pain.    HPI  Patient presents today for follow-up on low back pain.    Since previous appointment, she has been completing home physical therapy exercises.  Unfortunately cannot participate in physical therapy secondary to inability to get off of work.  States that her pain is now an 8 out of 10, which she states is a significant improvement from previous.  It is progressed from her bilateral low back to a similar portion of her central low back.  No lower extremity radiation, saddle anesthesia or antalgic gait.  She has been taking Advil with management of symptoms, states that the steroid significantly improved her pain.    Needs refills of her Lipitor and albuterol inhaler today.  She was also given instructions to schedule her mammogram.    ROS  Review of Systems   Constitutional:  Negative for activity change and unexpected weight change.   HENT:  Negative for

## 2025-07-18 DIAGNOSIS — I10 HYPERTENSION, UNSPECIFIED TYPE: ICD-10-CM

## 2025-07-18 NOTE — TELEPHONE ENCOUNTER
Medication:   Requested Prescriptions     Pending Prescriptions Disp Refills    hydroCHLOROthiazide 12.5 MG capsule 90 capsule 1     Sig: Take 1 capsule by mouth every morning        Last Filled:  12/31/24    Patient Phone Number: [phone] (home)     Last appt: 6/10/2025   Next appt: 9/8/2025    Last OARRS:        No data to display

## 2025-07-20 DIAGNOSIS — I10 HYPERTENSION, UNSPECIFIED TYPE: ICD-10-CM

## 2025-07-20 RX ORDER — HYDROCHLOROTHIAZIDE 12.5 MG/1
12.5 CAPSULE ORAL EVERY MORNING
Qty: 90 CAPSULE | Refills: 1 | Status: SHIPPED | OUTPATIENT
Start: 2025-07-20 | End: 2026-01-16

## 2025-07-21 RX ORDER — HYDROCHLOROTHIAZIDE 12.5 MG/1
12.5 CAPSULE ORAL EVERY MORNING
Qty: 90 CAPSULE | Refills: 1 | OUTPATIENT
Start: 2025-07-21 | End: 2026-01-17

## 2025-07-21 NOTE — TELEPHONE ENCOUNTER
Medication:   Requested Prescriptions     Pending Prescriptions Disp Refills    hydroCHLOROthiazide 12.5 MG capsule [Pharmacy Med Name: Hydrochlorothiazide 12.5mg Capsule] 90 capsule 1     Sig: Take 1 capsule by mouth every morning.        Last Filled:      Patient Phone Number: [phone] (home)     Last appt: 6/10/2025   Next appt: 9/8/2025    Last OARRS:        No data to display

## 2025-07-24 ENCOUNTER — TELEPHONE (OUTPATIENT)
Dept: PRIMARY CARE CLINIC | Age: 50
End: 2025-07-24

## 2025-07-24 ENCOUNTER — PATIENT MESSAGE (OUTPATIENT)
Dept: PRIMARY CARE CLINIC | Age: 50
End: 2025-07-24

## 2025-07-24 DIAGNOSIS — I10 HYPERTENSION, UNSPECIFIED TYPE: ICD-10-CM

## 2025-07-24 DIAGNOSIS — I10 ESSENTIAL HYPERTENSION: ICD-10-CM

## 2025-07-24 NOTE — TELEPHONE ENCOUNTER
Pt clarified she takes both medications and would like refills on both. Pended to [employer].    Medication:   Requested Prescriptions     Pending Prescriptions Disp Refills    triamterene-hydroCHLOROthiazide (MAXZIDE-25) 37.5-25 MG per tablet 90 tablet 3     Sig: Take 1 tablet by mouth daily    hydroCHLOROthiazide 12.5 MG capsule 90 capsule 3     Sig: Take 1 capsule by mouth every morning        Last Filled:      Patient Phone Number: [phone] (home)     Last appt: 6/10/2025   Next appt: 9/8/2025    Last OARRS:        No data to display

## 2025-07-24 NOTE — TELEPHONE ENCOUNTER
Please call patient to verify if she is taking hydrochlorothiazide or triamterene-hydrochlorothiazide?

## 2025-07-25 RX ORDER — HYDROCHLOROTHIAZIDE 12.5 MG/1
12.5 CAPSULE ORAL EVERY MORNING
Qty: 90 CAPSULE | Refills: 3 | Status: SHIPPED | OUTPATIENT
Start: 2025-07-25 | End: 2026-07-20

## 2025-07-25 RX ORDER — TRIAMTERENE AND HYDROCHLOROTHIAZIDE 37.5; 25 MG/1; MG/1
1 TABLET ORAL DAILY
Qty: 90 TABLET | Refills: 3 | Status: SHIPPED | OUTPATIENT
Start: 2025-07-25